# Patient Record
Sex: FEMALE
[De-identification: names, ages, dates, MRNs, and addresses within clinical notes are randomized per-mention and may not be internally consistent; named-entity substitution may affect disease eponyms.]

---

## 2018-10-08 PROBLEM — Z00.00 ENCOUNTER FOR PREVENTIVE HEALTH EXAMINATION: Status: ACTIVE | Noted: 2018-10-08

## 2018-10-19 DIAGNOSIS — N63.0 UNSPECIFIED LUMP IN UNSPECIFIED BREAST: ICD-10-CM

## 2018-11-30 ENCOUNTER — APPOINTMENT (OUTPATIENT)
Dept: OBGYN | Facility: CLINIC | Age: 63
End: 2018-11-30
Payer: COMMERCIAL

## 2018-11-30 VITALS
SYSTOLIC BLOOD PRESSURE: 110 MMHG | DIASTOLIC BLOOD PRESSURE: 66 MMHG | BODY MASS INDEX: 38.82 KG/M2 | WEIGHT: 233 LBS | HEIGHT: 65 IN

## 2018-11-30 DIAGNOSIS — I10 ESSENTIAL (PRIMARY) HYPERTENSION: ICD-10-CM

## 2018-11-30 DIAGNOSIS — E03.9 HYPOTHYROIDISM, UNSPECIFIED: ICD-10-CM

## 2018-11-30 DIAGNOSIS — R92.8 OTHER ABNORMAL AND INCONCLUSIVE FINDINGS ON DIAGNOSTIC IMAGING OF BREAST: ICD-10-CM

## 2018-11-30 DIAGNOSIS — Z82.49 FAMILY HISTORY OF ISCHEMIC HEART DISEASE AND OTHER DISEASES OF THE CIRCULATORY SYSTEM: ICD-10-CM

## 2018-11-30 PROCEDURE — 99213 OFFICE O/P EST LOW 20 MIN: CPT

## 2018-11-30 RX ORDER — LEVOTHYROXINE SODIUM 125 UG/1
125 TABLET ORAL
Refills: 0 | Status: ACTIVE | COMMUNITY
Start: 2018-11-30

## 2018-11-30 RX ORDER — LOSARTAN POTASSIUM 100 MG/1
100 TABLET, FILM COATED ORAL
Refills: 0 | Status: ACTIVE | COMMUNITY
Start: 2018-11-30

## 2019-01-05 ENCOUNTER — RECORD ABSTRACTING (OUTPATIENT)
Age: 64
End: 2019-01-05

## 2019-01-05 DIAGNOSIS — R92.2 INCONCLUSIVE MAMMOGRAM: ICD-10-CM

## 2019-01-05 DIAGNOSIS — E03.9 HYPOTHYROIDISM, UNSPECIFIED: ICD-10-CM

## 2019-01-05 DIAGNOSIS — Z87.898 PERSONAL HISTORY OF OTHER SPECIFIED CONDITIONS: ICD-10-CM

## 2019-01-05 DIAGNOSIS — R73.03 PREDIABETES.: ICD-10-CM

## 2019-01-05 DIAGNOSIS — Z86.79 PERSONAL HISTORY OF OTHER DISEASES OF THE CIRCULATORY SYSTEM: ICD-10-CM

## 2019-01-05 DIAGNOSIS — G47.30 SLEEP APNEA, UNSPECIFIED: ICD-10-CM

## 2019-09-06 ENCOUNTER — APPOINTMENT (OUTPATIENT)
Dept: OBGYN | Facility: CLINIC | Age: 64
End: 2019-09-06
Payer: COMMERCIAL

## 2019-09-06 DIAGNOSIS — Z01.419 ENCOUNTER FOR GYNECOLOGICAL EXAMINATION (GENERAL) (ROUTINE) W/OUT ABNORMAL FINDINGS: ICD-10-CM

## 2019-09-06 PROCEDURE — 99397 PER PM REEVAL EST PAT 65+ YR: CPT

## 2019-09-06 NOTE — PHYSICAL EXAM
[Awake] : awake [Alert] : alert [Soft] : soft [Oriented x3] : oriented to person, place, and time [No Lesions] : no genitalia lesions [Normal] : vagina [Labia Majora] : labia major [Labia Minora] : labia minora [No Bleeding] : there was no active vaginal bleeding [Absent] : absent [Adnexa Absent] : absent bilaterally [Acute Distress] : no acute distress [Mass] : no breast mass [Nipple Discharge] : no nipple discharge [Axillary LAD] : no axillary lymphadenopathy [Tender] : non tender [External Hemorrhoid] : no external hemorrhoids

## 2019-12-04 ENCOUNTER — RECORD ABSTRACTING (OUTPATIENT)
Age: 64
End: 2019-12-04

## 2019-12-04 DIAGNOSIS — Z78.9 OTHER SPECIFIED HEALTH STATUS: ICD-10-CM

## 2019-12-04 DIAGNOSIS — Z82.49 FAMILY HISTORY OF ISCHEMIC HEART DISEASE AND OTHER DISEASES OF THE CIRCULATORY SYSTEM: ICD-10-CM

## 2019-12-04 LAB
CYTOLOGY CVX/VAG DOC THIN PREP: NORMAL
CYTOLOGY CVX/VAG DOC THIN PREP: NORMAL

## 2020-12-21 PROBLEM — Z01.419 ENCOUNTER FOR WELL WOMAN EXAM WITH ROUTINE GYNECOLOGICAL EXAM: Status: RESOLVED | Noted: 2019-09-06 | Resolved: 2020-12-21

## 2022-06-26 ENCOUNTER — FORM ENCOUNTER (OUTPATIENT)
Age: 67
End: 2022-06-26

## 2024-07-30 ENCOUNTER — EMERGENCY (EMERGENCY)
Facility: HOSPITAL | Age: 69
LOS: 0 days | Discharge: HOME | End: 2024-07-31
Attending: STUDENT IN AN ORGANIZED HEALTH CARE EDUCATION/TRAINING PROGRAM
Payer: MEDICARE

## 2024-07-30 VITALS
WEIGHT: 238.98 LBS | HEIGHT: 65 IN | DIASTOLIC BLOOD PRESSURE: 80 MMHG | RESPIRATION RATE: 18 BRPM | OXYGEN SATURATION: 98 % | SYSTOLIC BLOOD PRESSURE: 181 MMHG | TEMPERATURE: 98 F | HEART RATE: 58 BPM
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
## 2024-07-30 DIAGNOSIS — M25.532 PAIN IN LEFT WRIST: ICD-10-CM
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
## 2024-07-30 PROCEDURE — 99053 MED SERV 10PM-8AM 24 HR FAC: CPT

## 2024-07-30 PROCEDURE — 99284 EMERGENCY DEPT VISIT MOD MDM: CPT

## 2024-07-30 NOTE — ED ADULT TRIAGE NOTE - CHIEF COMPLAINT QUOTE
Patient injured her left wrist today s/p fall at the gym. Denies hitting her head. Pmh htn, hypothyroidism.

## 2024-07-31 VITALS
TEMPERATURE: 99 F | DIASTOLIC BLOOD PRESSURE: 83 MMHG | RESPIRATION RATE: 18 BRPM | HEART RATE: 67 BPM | OXYGEN SATURATION: 98 % | SYSTOLIC BLOOD PRESSURE: 169 MMHG

## 2024-07-31 PROCEDURE — 73110 X-RAY EXAM OF WRIST: CPT | Mod: 26,LT

## 2024-07-31 PROCEDURE — 73120 X-RAY EXAM OF HAND: CPT | Mod: 26,LT

## 2024-07-31 RX ORDER — OXYCODONE AND ACETAMINOPHEN 5; 325 MG/1; MG/1
1 TABLET ORAL
Qty: 8 | Refills: 0
Start: 2024-07-31 | End: 2024-08-03

## 2024-07-31 RX ORDER — NAPROXEN SODIUM 550 MG
1 TABLET ORAL
Qty: 20 | Refills: 0
Start: 2024-07-31 | End: 2024-08-09

## 2024-07-31 NOTE — ED ADULT NURSE NOTE - OBJECTIVE STATEMENT
Pt AOx4, ambulatory w/ steady gait. Pt presents to ED s/p accidental mechanical fall Tuesday evening around 6pm. Pt states she was at the gym and tripped over the gym equipment, fell on L wrist. Pt presently c/o 9/10 pain in the L wrist. Pt denies head strike/LOC, denies taking blood thinners. As per pt, PMH HTN, hypothyroidism.

## 2024-07-31 NOTE — ED PROVIDER NOTE - CLINICAL SUMMARY MEDICAL DECISION MAKING FREE TEXT BOX
69-year-old female no significant past medical history presenting with wrist pain for 6 hours.  Patient had FOOSH injury on left wrist, has diffuse tenderness.  Denies any head trauma.  Denies taking any analgesia before coming here.  Exam findings above, patient hemodynamically stable.  Will treat pain with Percocet.  Clinical presentation likely secondary to sprain, x-ray ordered which showed no fracture of the hand or wrist.  Call orthopedist for second opinion, no x-ray fracture appreciated, no snuffbox tenderness.  Will apply Ace wrap, give sprain instructions and give sports medicine follow-up and prescribe analgesia.  Patient comfortable with plan for discharge

## 2024-07-31 NOTE — ED PROVIDER NOTE - PHYSICAL EXAMINATION
General: No acute distress, mentation at baseline,  well nourished, well developed  HEENT: NCAT, Neck supple without meningismus, PERRL, no conjunctival injection  Lungs: CTAB, No wheeze or crackles, No retractions, No increased work of breathing  Heart: S1S2 RRR, No M/R/G, Pules equal Bilaterally in upper and lower extremities distally  Abd: soft, NT/ND, No guarding, No rebound.  No hernias, no palpable masses.  LUE: Fingers: normal inspection, non tender, normal color, normal tendon function including FDS and FDP functions   Hand: normal inspection, non tender, normal color, tendon function intact   Wrist: normal inspection, diffuse wrist tenderness w swelling  All motor function and sensation intact among ulnar/radial/median nerve distribution   Skin: No rash noted, warm dry.  Neuro:  Grossly normal.  No difficulty ambulating. No focal deficits.  Psychiatric: Appropriate mood and affect.

## 2024-07-31 NOTE — ED PROVIDER NOTE - NSFOLLOWUPCLINICS_GEN_ALL_ED_FT
[facility] Sports Medicine  Sports Medicine  [address] [place] [address]  Phone: [phone]  Fax:

## 2024-07-31 NOTE — ED PROVIDER NOTE - PATIENT PORTAL LINK FT
You can access the FollowMyHealth Patient Portal offered by [facility] by registering at the following website: [email or web address]. By joining [facility]’s FollowMyHealth portal, you will also be able to view your health information using other applications (apps) compatible with our system.

## 2024-07-31 NOTE — ED PROVIDER NOTE - NS ED ROS FT
CONST: no fevers, no chills  EYES: no pain, no vision changes  ENT: no sore throat, no ear pain, no change in hearing  CV: no chest pain, no leg swelling  RESP: no shortness of breath, no cough  ABD: no abdominal pain, no nausea, no vomiting, no diarrhea  : no dysuria, no flank pain, no hematuria  MSK: no back pain, (+) extremity pain  NEURO: no headache or additional neurologic complaints  HEME: no easy bleeding  SKIN:  no rash
[phone]

## 2024-08-01 PROBLEM — Z78.9 OTHER SPECIFIED HEALTH STATUS: Chronic | Status: ACTIVE | Noted: 2024-07-31

## 2024-08-08 ENCOUNTER — APPOINTMENT (OUTPATIENT)
Dept: SPORTS MEDICINE | Facility: CLINIC | Age: 69
End: 2024-08-08

## 2024-08-08 PROCEDURE — 99214 OFFICE O/P EST MOD 30 MIN: CPT

## 2024-08-08 NOTE — DISCUSSION/SUMMARY
[2 Weeks] : in 2 weeks [de-identified] : Attending Attestation:  Seen with Dr. Rodas I saw and evaluated the patient and was involved with and agree with the fellow's history, physical exam, and I personally documented the assessment and plan of care.  69y yo female pt who is RHD w/ PMHx HTN, hypothyroid, fatty liver who presents to the clinic for pain to left base of thumb and dorsal wrist pain after FOOH injury on 7/30/2024. The patient has been utilizing PO analgesia and volar wrist splint. Patient's pain has been improving since and is no longer requiring PO medication for pain control.  Patient with tenderness over the radiocarpal joint, first CMC joint, scapholunate and DRUJ.  Given diffuse areas of pain and tenderness, will immobilize and reassess to evaluate if symptoms localize to guide further evaluation with imaging and treatment.  Will exchange volar splint to a thumb spica splint.  Patient declining pain medication at this time  Will treat symptoms with conservative management with activity modification, analgesic medications, HEP/PT, and re-evaluate the patient to see if they would benefit from further diagnostic testing, or referral for injection therapy or surgical intervention.   Clinical and radiographic findings discussed. Diagnosis, prognosis and treatment options reviewed. Patient verbalizes understanding and all questions answered. The patient adamant at this time about not having any type of surgical intervention. Wants only to continue with non-operative management.     PLAN: Thumb spina brace  Additional Testing: none  Further Intervention: HEP reviewed, consider PT in future  Weight-bearing Status: WBAT  Assistive Devices: None  Health Management: BMI/Weight Management and physical activity level reviewed with the patient  Home Modalities: RICE protocol for pain/swelling and home modalities reviewed with the patient  Medications: OTC analgesics  Orthotics: Thumb spica brace  Work Status: Retired  Activity Status: Avoid aggravating and high impact activities  Sports/Gym Status: No gym or sports until cleared medically  Follow-up: 2 weeks for reevaluation, repeat x-ray  Time Based Billing: Total time spent with patient: 41 minutes. More than 50% of today's visit was devoted to face-to-face counseling regarding the following:   education and discussion about the diagnosis, prognosis, treatment options, and possible complications of treatments. Discussed treatment options. Discussed risks and benefits of treatment. Instructions covering recommended treatment. Detection of adverse events and importance of compliance. Discussed prognosis.   Umberto Donaldson MD

## 2024-08-08 NOTE — HISTORY OF PRESENT ILLNESS
[de-identified] : 68yo female pt who is RHD w/ PMHx of HTN, HLD, fatty liver, hypothyroidism who presents for left thumb and hand pain since 7/30 after FOOH injury. The patient was evaluated in the ED on 7/31 and was sent home with ACE wrap and analgesia PO after xray resulted with no fractures. The patient was taking Tylenol and icing the extremity initially which helped with the pain. For the past couple of days, has deferred from taking medication given improvement of pain. She has been wearing a short volar wrist spint and using heat packs over the volar palm. Pain does get exacerbated with ROM and strength/ resistance.

## 2024-08-08 NOTE — REVIEW OF SYSTEMS
[Joint Pain] : joint pain [Joint Swelling] : joint swelling [Negative] : Neurological [Fever] : no fever [Chills] : no chills [[email or web address]] : L thumb pain

## 2024-08-08 NOTE — DISCUSSION/SUMMARY
[2 Weeks] : in 2 weeks [de-identified] : Attending Attestation:  Seen with Dr. Rodas I saw and evaluated the patient and was involved with and agree with the fellow's history, physical exam, and I personally documented the assessment and plan of care.  69y yo female pt who is RHD w/ PMHx HTN, hypothyroid, fatty liver who presents to the clinic for pain to left base of thumb and dorsal wrist pain after FOOH injury on 7/30/2024. The patient has been utilizing PO analgesia and volar wrist splint. Patient's pain has been improving since and is no longer requiring PO medication for pain control.  Patient with tenderness over the radiocarpal joint, first CMC joint, scapholunate and DRUJ.  Given diffuse areas of pain and tenderness, will immobilize and reassess to evaluate if symptoms localize to guide further evaluation with imaging and treatment.  Will exchange volar splint to a thumb spica splint.  Patient declining pain medication at this time  Will treat symptoms with conservative management with activity modification, analgesic medications, HEP/PT, and re-evaluate the patient to see if they would benefit from further diagnostic testing, or referral for injection therapy or surgical intervention.   Clinical and radiographic findings discussed. Diagnosis, prognosis and treatment options reviewed. Patient verbalizes understanding and all questions answered. The patient adamant at this time about not having any type of surgical intervention. Wants only to continue with non-operative management.     PLAN: Thumb spina brace  Additional Testing: none  Further Intervention: HEP reviewed, consider PT in future  Weight-bearing Status: WBAT  Assistive Devices: None  Health Management: BMI/Weight Management and physical activity level reviewed with the patient  Home Modalities: RICE protocol for pain/swelling and home modalities reviewed with the patient  Medications: OTC analgesics  Orthotics: Thumb spica brace  Work Status: Retired  Activity Status: Avoid aggravating and high impact activities  Sports/Gym Status: No gym or sports until cleared medically  Follow-up: 2 weeks for reevaluation, repeat x-ray  Time Based Billing: Total time spent with patient: 41 minutes. More than 50% of today's visit was devoted to face-to-face counseling regarding the following:   education and discussion about the diagnosis, prognosis, treatment options, and possible complications of treatments. Discussed treatment options. Discussed risks and benefits of treatment. Instructions covering recommended treatment. Detection of adverse events and importance of compliance. Discussed prognosis.   Umberto Donaldson MD

## 2024-08-08 NOTE — HISTORY OF PRESENT ILLNESS
[de-identified] : 70yo female pt who is RHD w/ PMHx of HTN, HLD, fatty liver, hypothyroidism who presents for left thumb and hand pain since 7/30 after FOOH injury. The patient was evaluated in the ED on 7/31 and was sent home with ACE wrap and analgesia PO after xray resulted with no fractures. The patient was taking Tylenol and icing the extremity initially which helped with the pain. For the past couple of days, has deferred from taking medication given improvement of pain. She has been wearing a short volar wrist spint and using heat packs over the volar palm. Pain does get exacerbated with ROM and strength/ resistance.

## 2024-08-08 NOTE — HISTORY OF PRESENT ILLNESS
[de-identified] : 68yo female pt who is RHD w/ PMHx of HTN, HLD, fatty liver, hypothyroidism who presents for left thumb and hand pain since 7/30 after FOOH injury. The patient was evaluated in the ED on 7/31 and was sent home with ACE wrap and analgesia PO after xray resulted with no fractures. The patient was taking Tylenol and icing the extremity initially which helped with the pain. For the past couple of days, has deferred from taking medication given improvement of pain. She has been wearing a short volar wrist spint and using heat packs over the volar palm. Pain does get exacerbated with ROM and strength/ resistance.

## 2024-08-08 NOTE — HISTORY OF PRESENT ILLNESS
[de-identified] : 68yo female pt who is RHD w/ PMHx of HTN, HLD, fatty liver, hypothyroidism who presents for left thumb and hand pain since 7/30 after FOOH injury. The patient was evaluated in the ED on 7/31 and was sent home with ACE wrap and analgesia PO after xray resulted with no fractures. The patient was taking Tylenol and icing the extremity initially which helped with the pain. For the past couple of days, has deferred from taking medication given improvement of pain. She has been wearing a short volar wrist spint and using heat packs over the volar palm. Pain does get exacerbated with ROM and strength/ resistance.

## 2024-08-08 NOTE — DISCUSSION/SUMMARY
[2 Weeks] : in 2 weeks [de-identified] : Attending Attestation:  Seen with Dr. Rodas I saw and evaluated the patient and was involved with and agree with the fellow's history, physical exam, and I personally documented the assessment and plan of care.  69y yo female pt who is RHD w/ PMHx HTN, hypothyroid, fatty liver who presents to the clinic for pain to left base of thumb and dorsal wrist pain after FOOH injury on 7/30/2024. The patient has been utilizing PO analgesia and volar wrist splint. Patient's pain has been improving since and is no longer requiring PO medication for pain control.  Patient with tenderness over the radiocarpal joint, first CMC joint, scapholunate and DRUJ.  Given diffuse areas of pain and tenderness, will immobilize and reassess to evaluate if symptoms localize to guide further evaluation with imaging and treatment.  Will exchange volar splint to a thumb spica splint.  Patient declining pain medication at this time  Will treat symptoms with conservative management with activity modification, analgesic medications, HEP/PT, and re-evaluate the patient to see if they would benefit from further diagnostic testing, or referral for injection therapy or surgical intervention.   Clinical and radiographic findings discussed. Diagnosis, prognosis and treatment options reviewed. Patient verbalizes understanding and all questions answered. The patient adamant at this time about not having any type of surgical intervention. Wants only to continue with non-operative management.     PLAN: Thumb spina brace  Additional Testing: none  Further Intervention: HEP reviewed, consider PT in future  Weight-bearing Status: WBAT  Assistive Devices: None  Health Management: BMI/Weight Management and physical activity level reviewed with the patient  Home Modalities: RICE protocol for pain/swelling and home modalities reviewed with the patient  Medications: OTC analgesics  Orthotics: Thumb spica brace  Work Status: Retired  Activity Status: Avoid aggravating and high impact activities  Sports/Gym Status: No gym or sports until cleared medically  Follow-up: 2 weeks for reevaluation, repeat x-ray  Time Based Billing: Total time spent with patient: 41 minutes. More than 50% of today's visit was devoted to face-to-face counseling regarding the following:   education and discussion about the diagnosis, prognosis, treatment options, and possible complications of treatments. Discussed treatment options. Discussed risks and benefits of treatment. Instructions covering recommended treatment. Detection of adverse events and importance of compliance. Discussed prognosis.   Umberto Donaldson MD

## 2024-08-08 NOTE — HISTORY OF PRESENT ILLNESS
[de-identified] : 68yo female pt who is RHD w/ PMHx of HTN, HLD, fatty liver, hypothyroidism who presents for left thumb and hand pain since 7/30 after FOOH injury. The patient was evaluated in the ED on 7/31 and was sent home with ACE wrap and analgesia PO after xray resulted with no fractures. The patient was taking Tylenol and icing the extremity initially which helped with the pain. For the past couple of days, has deferred from taking medication given improvement of pain. She has been wearing a short volar wrist spint and using heat packs over the volar palm. Pain does get exacerbated with ROM and strength/ resistance.

## 2024-08-08 NOTE — DISCUSSION/SUMMARY
[2 Weeks] : in 2 weeks [de-identified] : Attending Attestation:  Seen with Dr. Rodas I saw and evaluated the patient and was involved with and agree with the fellow's history, physical exam, and I personally documented the assessment and plan of care.  69y yo female pt who is RHD w/ PMHx HTN, hypothyroid, fatty liver who presents to the clinic for pain to left base of thumb and dorsal wrist pain after FOOH injury on 7/30/2024. The patient has been utilizing PO analgesia and volar wrist splint. Patient's pain has been improving since and is no longer requiring PO medication for pain control.  Patient with tenderness over the radiocarpal joint, first CMC joint, scapholunate and DRUJ.  Given diffuse areas of pain and tenderness, will immobilize and reassess to evaluate if symptoms localize to guide further evaluation with imaging and treatment.  Will exchange volar splint to a thumb spica splint.  Patient declining pain medication at this time  Will treat symptoms with conservative management with activity modification, analgesic medications, HEP/PT, and re-evaluate the patient to see if they would benefit from further diagnostic testing, or referral for injection therapy or surgical intervention.   Clinical and radiographic findings discussed. Diagnosis, prognosis and treatment options reviewed. Patient verbalizes understanding and all questions answered. The patient adamant at this time about not having any type of surgical intervention. Wants only to continue with non-operative management.     PLAN: Thumb spina brace  Additional Testing: none  Further Intervention: HEP reviewed, consider PT in future  Weight-bearing Status: WBAT  Assistive Devices: None  Health Management: BMI/Weight Management and physical activity level reviewed with the patient  Home Modalities: RICE protocol for pain/swelling and home modalities reviewed with the patient  Medications: OTC analgesics  Orthotics: Thumb spica brace  Work Status: Retired  Activity Status: Avoid aggravating and high impact activities  Sports/Gym Status: No gym or sports until cleared medically  Follow-up: 2 weeks for reevaluation, repeat x-ray  Time Based Billing: Total time spent with patient: 41 minutes. More than 50% of today's visit was devoted to face-to-face counseling regarding the following:   education and discussion about the diagnosis, prognosis, treatment options, and possible complications of treatments. Discussed treatment options. Discussed risks and benefits of treatment. Instructions covering recommended treatment. Detection of adverse events and importance of compliance. Discussed prognosis.   Umberto Donaldson MD

## 2024-08-08 NOTE — DISCUSSION/SUMMARY
[2 Weeks] : in 2 weeks [de-identified] : Attending Attestation:  Seen with Dr. Rodas I saw and evaluated the patient and was involved with and agree with the fellow's history, physical exam, and I personally documented the assessment and plan of care.  69y yo female pt who is RHD w/ PMHx HTN, hypothyroid, fatty liver who presents to the clinic for pain to left base of thumb and dorsal wrist pain after FOOH injury on 7/30/2024. The patient has been utilizing PO analgesia and volar wrist splint. Patient's pain has been improving since and is no longer requiring PO medication for pain control.  Patient with tenderness over the radiocarpal joint, first CMC joint, scapholunate and DRUJ.  Given diffuse areas of pain and tenderness, will immobilize and reassess to evaluate if symptoms localize to guide further evaluation with imaging and treatment.  Will exchange volar splint to a thumb spica splint.  Patient declining pain medication at this time  Will treat symptoms with conservative management with activity modification, analgesic medications, HEP/PT, and re-evaluate the patient to see if they would benefit from further diagnostic testing, or referral for injection therapy or surgical intervention.   Clinical and radiographic findings discussed. Diagnosis, prognosis and treatment options reviewed. Patient verbalizes understanding and all questions answered. The patient adamant at this time about not having any type of surgical intervention. Wants only to continue with non-operative management.     PLAN: Thumb spina brace  Additional Testing: none  Further Intervention: HEP reviewed, consider PT in future  Weight-bearing Status: WBAT  Assistive Devices: None  Health Management: BMI/Weight Management and physical activity level reviewed with the patient  Home Modalities: RICE protocol for pain/swelling and home modalities reviewed with the patient  Medications: OTC analgesics  Orthotics: Thumb spica brace  Work Status: Retired  Activity Status: Avoid aggravating and high impact activities  Sports/Gym Status: No gym or sports until cleared medically  Follow-up: 2 weeks for reevaluation, repeat x-ray  Time Based Billing: Total time spent with patient: 41 minutes. More than 50% of today's visit was devoted to face-to-face counseling regarding the following:   education and discussion about the diagnosis, prognosis, treatment options, and possible complications of treatments. Discussed treatment options. Discussed risks and benefits of treatment. Instructions covering recommended treatment. Detection of adverse events and importance of compliance. Discussed prognosis.   Umberto Donaldson MD

## 2024-08-12 NOTE — PHYSICAL EXAM
[de-identified] : General Appearance: Well-nourished, well developed in no acute distress Orientation: Oriented to person, place and time. Gait: normal Coordination: normal Inspection / Palpation UE (R/L): Edema noted to left dorsal aspect of thumb and wrist. Tender over the MCP and basal carpometacarpal joint of first digit with carporadial joint.  No tenderness over the scaphoid tubercle, no significant pain with axial loading. 1st digit Flexion found painful on L on exam. 1st digit Extension found painful on L on exam. 1st digit Adduction found painful on L on exam. 1st digit MCP abduction and adduction strength limited iso pain Vasculature: 2+ radial pulses Skin: no ecchymosis     [de-identified] : Ultrasound performed of the Left distal upper extremity. Edema was visualized surrounding the extensor ligaments in the 4th compartment of wrist.

## 2024-08-12 NOTE — PHYSICAL EXAM
[de-identified] : General Appearance: Well-nourished, well developed in no acute distress Orientation: Oriented to person, place and time. Gait: normal Coordination: normal Inspection / Palpation UE (R/L): Edema noted to left dorsal aspect of thumb and wrist. Tender over the MCP and basal carpometacarpal joint of first digit with carporadial joint.  No tenderness over the scaphoid tubercle, no significant pain with axial loading. 1st digit Flexion found painful on L on exam. 1st digit Extension found painful on L on exam. 1st digit Adduction found painful on L on exam. 1st digit MCP abduction and adduction strength limited iso pain Vasculature: 2+ radial pulses Skin: no ecchymosis     [de-identified] : Ultrasound performed of the Left distal upper extremity. Edema was visualized surrounding the extensor ligaments in the 4th compartment of wrist.

## 2024-08-12 NOTE — PHYSICAL EXAM
[de-identified] : General Appearance: Well-nourished, well developed in no acute distress Orientation: Oriented to person, place and time. Gait: normal Coordination: normal Inspection / Palpation UE (R/L): Edema noted to left dorsal aspect of thumb and wrist. Tender over the MCP and basal carpometacarpal joint of first digit with carporadial joint.  No tenderness over the scaphoid tubercle, no significant pain with axial loading. 1st digit Flexion found painful on L on exam. 1st digit Extension found painful on L on exam. 1st digit Adduction found painful on L on exam. 1st digit MCP abduction and adduction strength limited iso pain Vasculature: 2+ radial pulses Skin: no ecchymosis     [de-identified] : Ultrasound performed of the Left distal upper extremity. Edema was visualized surrounding the extensor ligaments in the 4th compartment of wrist.

## 2024-08-12 NOTE — PHYSICAL EXAM
[de-identified] : General Appearance: Well-nourished, well developed in no acute distress Orientation: Oriented to person, place and time. Gait: normal Coordination: normal Inspection / Palpation UE (R/L): Edema noted to left dorsal aspect of thumb and wrist. Tender over the MCP and basal carpometacarpal joint of first digit with carporadial joint.  No tenderness over the scaphoid tubercle, no significant pain with axial loading. 1st digit Flexion found painful on L on exam. 1st digit Extension found painful on L on exam. 1st digit Adduction found painful on L on exam. 1st digit MCP abduction and adduction strength limited iso pain Vasculature: 2+ radial pulses Skin: no ecchymosis     [de-identified] : Ultrasound performed of the Left distal upper extremity. Edema was visualized surrounding the extensor ligaments in the 4th compartment of wrist.

## 2024-08-12 NOTE — PHYSICAL EXAM
[de-identified] : General Appearance: Well-nourished, well developed in no acute distress Orientation: Oriented to person, place and time. Gait: normal Coordination: normal Inspection / Palpation UE (R/L): Edema noted to left dorsal aspect of thumb and wrist. Tender over the MCP and basal carpometacarpal joint of first digit with carporadial joint.  No tenderness over the scaphoid tubercle, no significant pain with axial loading. 1st digit Flexion found painful on L on exam. 1st digit Extension found painful on L on exam. 1st digit Adduction found painful on L on exam. 1st digit MCP abduction and adduction strength limited iso pain Vasculature: 2+ radial pulses Skin: no ecchymosis     [de-identified] : Ultrasound performed of the Left distal upper extremity. Edema was visualized surrounding the extensor ligaments in the 4th compartment of wrist.

## 2024-08-20 ENCOUNTER — APPOINTMENT (OUTPATIENT)
Dept: SPORTS MEDICINE | Facility: CLINIC | Age: 69
End: 2024-08-20
Payer: MEDICARE

## 2024-08-20 VITALS — HEIGHT: 65 IN | WEIGHT: 239 LBS | BODY MASS INDEX: 39.82 KG/M2

## 2024-08-20 DIAGNOSIS — M25.532 PAIN IN LEFT WRIST: ICD-10-CM

## 2024-08-20 PROCEDURE — 99214 OFFICE O/P EST MOD 30 MIN: CPT

## 2024-08-20 NOTE — DISCUSSION/SUMMARY
[2 Weeks] : in 2 weeks [de-identified] : Attending Attestation:  Seen with Dr. Haley I saw and evaluated the patient and was involved with and agree with the fellow's history, physical exam, and I personally documented the assessment and plan of care.  69y yo female pt who is RHD w/ PMHx HTN, hypothyroid, fatty liver who presents to the clinic for pain to left base of thumb and dorsal wrist pain after FOOH injury on 7/30/2024. Clinically, she continues to improve, but does have evidence of possible TFCC injury and dequervain tenosynovititis vs wrist ligamentous sprain. Not consistent with occult scaphoid fracture given diffuseness of tenderness, and no callus on repeat imaging.  We will continue with thumb spica immobilization and start with PT. She may use OTC pain meds as needed.   Will treat symptoms with conservative management with activity modification, analgesic medications, HEP/PT, and re-evaluate the patient to see if they would benefit from further diagnostic testing, or referral for injection therapy or surgical intervention.   Clinical and radiographic findings discussed. Diagnosis, prognosis and treatment options reviewed. Patient verbalizes understanding and all questions answered. The patient adamant at this time about not having any type of surgical intervention. Wants only to continue with non-operative management.    PLAN: Continue with thumb spica brace. PT referral sent  Additional Testing: none Further Intervention: PT, HEP Weight-bearing Status: WBAT Assistive Devices: None Health Management: BMI/Weight Management and physical activity level reviewed with the patient Home Modalities: RICE protocol for pain/swelling and home modalities reviewed with the patient Medications: OTC analgesics as needed Orthotics: Thumb spica brace  Work Status: Retired Activity Status: Avoid aggravating and high impact activities Sports/Gym Status: No gym or sports until cleared medically Follow-up: 4-6 weeks for reevaluation, consider MRI if no improvement in symptoms  Time Based Billing: Total time spent with patient: 31 minutes. More than 50% of today's visit was devoted to face-to-face counseling regarding the following:   education and discussion about the diagnosis, prognosis, treatment options, and possible complications of treatments. Discussed treatment options. Discussed risks and benefits of treatment. Instructions covering recommended treatment. Detection of adverse events and importance of compliance. Discussed prognosis.  Umberto Donaldson MD

## 2024-08-20 NOTE — HISTORY OF PRESENT ILLNESS
[de-identified] : 68yo female pt who is RHD w/ PMHx of HTN, HLD, fatty liver, hypothyroidism who presents for left thumb and hand pain since 7/30 after FOOH injury. The patient was evaluated in the ED on 7/31 and was sent home with ACE wrap and analgesia PO after xray resulted with no fractures. She presented to clinic 2 weeks ago and was switched to a removable thumb spica splint. Today, she reports resolution of most of her pain and swelling. She states minor movements (ulnar and radial deviation) now trigger pain along the top of her thumb, but this is very infrequent. No longer using OTC meds for pain. She is able to complete her ADLs. No numbness, tingling or weakness to thumb.

## 2024-08-20 NOTE — PHYSICAL EXAM
[de-identified] : General Appearance: Well-nourished, well developed in no acute distress Orientation: Oriented to person, place and time. Gait: normal Coordination: normal Inspection / Palpation UE (R/L):  Edema noted to left dorsal aspect of thumb and wrist (but improved). No tenderness over the MCP.  No tenderness over the scaphoid tubercle, no pain with axial loading. (+) TTP over the TFCC, 1st extensor compartment over the distal radius, and the CMC joint, minimal tenderness present over anatomic snuffbox. No tenderness with 1st digit flexion, extension 1st digit Flexion found painful on L on exam. 1st digit MCP abduction and adduction strength 5/5 and symmetric to R thumb Vasculature: 2+ radial pulses Skin: no ecchymosis, trace edema present Positive Finkelstein Positive ulnar grind test Negative piano key test Negative Ruiz test [de-identified] : 07/31/2024: Left hand and wrist xray from [facility] ED demonstrated 'Small well-corticated irregular ossific densities just medial  to the trapezium which may be related to trauma of indeterminate age.  Some osteoarthritic changes at the first carpometacarpal joint. '  08/08/2024: Ultrasound performed of the Left distal upper extremity. Edema was visualized surrounding the extensor ligaments in the 4th compartment of wrist.  08/20/2024: X-ray of the Left wrist demonstrates normal alignment of metacarpal bones without obvious deformity, fracture or callous formation (including over scaphoid). There is a small ossification radial to the trapezium. There is mild degenerative changes to the CMC joint. Appears similar compared to 7/31/2024.

## 2024-09-17 ENCOUNTER — APPOINTMENT (OUTPATIENT)
Dept: SPORTS MEDICINE | Facility: CLINIC | Age: 69
End: 2024-09-17
Payer: MEDICARE

## 2024-09-17 VITALS — HEIGHT: 65 IN | BODY MASS INDEX: 39.82 KG/M2 | WEIGHT: 239 LBS

## 2024-09-17 PROCEDURE — 99213 OFFICE O/P EST LOW 20 MIN: CPT

## 2024-09-17 NOTE — DISCUSSION/SUMMARY
[2 Weeks] : in 2 weeks [de-identified] : Attending Attestation:  Seen with Dr. Haley I saw and evaluated the patient and was involved with and agree with the fellow's history, physical exam, and I personally documented the assessment and plan of care.  69y yo female pt who is RHD w/ PMHx HTN, hypothyroid, fatty liver who presents to the clinic for pain to left base of thumb and dorsal wrist pain after FOOH injury on 7/30/2024. Her symptoms are continuing to improve and she is able to complete her ADLs without difficulties. She no longer has evidence of TFCC injury, but remains with mild De Quervain tenosynovitis. She may also have wrist ligamentous sprain. Not consistent with occult scaphoid fracture as she no longer has snuffbox ttp, and there was no callus on repeat imaging.  We will continue with nightly thumb spica immobilization and continue with 4 more weeks of OT. She may use OTC pain meds as needed. We discussed with the patient about obtaining MRI, but since she is continuing to improve and it would not likely change her management. However, If she does not continue to improve, we will pursue an MRI of her left wrist.   Will treat symptoms with conservative management with activity modification, analgesic medications, HEP/PT, and re-evaluate the patient to see if they would benefit from further diagnostic testing, or referral for injection therapy or surgical intervention.   Clinical and radiographic findings discussed. Diagnosis, prognosis and treatment options reviewed. Patient verbalizes understanding and all questions answered. The patient adamant at this time about not having any type of surgical intervention. Wants only to continue with non-operative management.    PLAN: Continue with thumb spica brace at night time and OT Additional Testing: none Further Intervention: OT, HEP Weight-bearing Status: WBAT Assistive Devices: None Health Management: BMI/Weight Management and physical activity level reviewed with the patient Home Modalities: RICE protocol for pain/swelling and home modalities reviewed with the patient Medications: OTC analgesics as needed Orthotics: Thumb spica brace  Work Status: Retired Activity Status: Avoid aggravating and high impact activities Sports/Gym Status: No gym or sports until cleared medically Follow-up: 4 weeks for reevaluation, consider MRI if no improvement in symptoms  Umberto Donaldson MD

## 2024-09-17 NOTE — HISTORY OF PRESENT ILLNESS
[de-identified] : 68yo female pt who is RHD w/ PMHx of HTN, HLD, fatty liver, hypothyroidism who presents for left thumb and hand pain since 7/30 after FOOH injury. The patient was evaluated in the ED on 7/31 and was sent home with ACE wrap and analgesia PO after xray resulted with no fractures.  9/17/24 She reports 90% improvement since initial injury. She has been going to OT twice a week for the last 4 weeks. She has transitioned into nightime thumb spica use only. States she has very infrequent episodes of pain and sometimes "shocking" sensation in her fingers. Still having swelling to her left hand. No numbness, weakness or pallor.

## 2024-09-17 NOTE — PHYSICAL EXAM
[de-identified] : General Appearance: Well-nourished, well developed in no acute distress Orientation: Oriented to person, place and time. Gait: normal Coordination: normal Inspection / Palpation UE (R/L):  Edema noted to left dorsal aspect of thumb and wrist and volar thenar aspect (stable compared to last time). No tenderness over the MCP.  No tenderness over the scaphoid tubercle, no pain with axial loading. There is no TTP over the TFCC. Minimally tender to the 1st extensor compartment over the distal radius, and the CMC joint. No tenderness present over anatomic snuffbox or DRUJ. No tenderness with 1st digit flexion, extension 1st digit Flexion found painful on L on exam. 1st digit MCP abduction and adduction strength 5/5 and symmetric to R thumb Vasculature: 2+ radial pulses Skin: no ecchymosis, trace edema present Positive Finkelstein Negative ulnar grind test Negative piano key test Negative Ruiz test [de-identified] : 07/31/2024: Left hand and wrist x[facility] from [facility] ED demonstrated 'Small well-corticated irregular ossific densities just medial  to the trapezium which may be related to trauma of indeterminate age.  Some osteoarthritic changes at the first carpometacarpal joint. '  08/08/2024: Ultrasound performed of the Left distal upper extremity. Edema was visualized surrounding the extensor ligaments in the 4th compartment of wrist.  08/20/2024: X-ray of the Left wrist demonstrates normal alignment of metacarpal bones without obvious deformity, fracture or callous formation (including over scaphoid). There is a small ossification radial to the trapezium. There is mild degenerative changes to the CMC joint. Appears similar compared to 7/31/2024.

## 2024-10-15 ENCOUNTER — APPOINTMENT (OUTPATIENT)
Dept: SPORTS MEDICINE | Facility: CLINIC | Age: 69
End: 2024-10-15
Payer: MEDICARE

## 2024-10-15 VITALS — BODY MASS INDEX: 39.65 KG/M2 | WEIGHT: 238 LBS | HEIGHT: 65 IN

## 2024-10-15 PROCEDURE — 99212 OFFICE O/P EST SF 10 MIN: CPT

## 2025-02-28 ENCOUNTER — APPOINTMENT (OUTPATIENT)
Dept: INTERNAL MEDICINE | Facility: CLINIC | Age: 70
End: 2025-02-28
Payer: MEDICARE

## 2025-02-28 ENCOUNTER — NON-APPOINTMENT (OUTPATIENT)
Age: 70
End: 2025-02-28

## 2025-02-28 ENCOUNTER — LABORATORY RESULT (OUTPATIENT)
Age: 70
End: 2025-02-28

## 2025-02-28 VITALS
HEART RATE: 60 BPM | BODY MASS INDEX: 40.98 KG/M2 | HEIGHT: 65 IN | SYSTOLIC BLOOD PRESSURE: 167 MMHG | TEMPERATURE: 97.7 F | WEIGHT: 246 LBS | DIASTOLIC BLOOD PRESSURE: 86 MMHG | OXYGEN SATURATION: 98 %

## 2025-02-28 VITALS — DIASTOLIC BLOOD PRESSURE: 82 MMHG | SYSTOLIC BLOOD PRESSURE: 156 MMHG

## 2025-02-28 DIAGNOSIS — N63.0 UNSPECIFIED LUMP IN UNSPECIFIED BREAST: ICD-10-CM

## 2025-02-28 DIAGNOSIS — M25.562 PAIN IN LEFT KNEE: ICD-10-CM

## 2025-02-28 DIAGNOSIS — K80.20 CALCULUS OF GALLBLADDER W/OUT CHOLECYSTITIS W/OUT OBSTRUCTION: ICD-10-CM

## 2025-02-28 DIAGNOSIS — M85.80 OTHER SPECIFIED DISORDERS OF BONE DENSITY AND STRUCTURE, UNSPECIFIED SITE: ICD-10-CM

## 2025-02-28 DIAGNOSIS — E03.9 HYPOTHYROIDISM, UNSPECIFIED: ICD-10-CM

## 2025-02-28 DIAGNOSIS — I10 ESSENTIAL (PRIMARY) HYPERTENSION: ICD-10-CM

## 2025-02-28 DIAGNOSIS — Z00.00 ENCOUNTER FOR GENERAL ADULT MEDICAL EXAMINATION W/OUT ABNORMAL FINDINGS: ICD-10-CM

## 2025-02-28 PROCEDURE — 93000 ELECTROCARDIOGRAM COMPLETE: CPT

## 2025-02-28 PROCEDURE — 90471 IMMUNIZATION ADMIN: CPT

## 2025-02-28 PROCEDURE — 90715 TDAP VACCINE 7 YRS/> IM: CPT

## 2025-02-28 PROCEDURE — 90677 PCV20 VACCINE IM: CPT

## 2025-02-28 PROCEDURE — G0439: CPT

## 2025-02-28 PROCEDURE — G0009: CPT | Mod: 59

## 2025-02-28 RX ORDER — LOSARTAN POTASSIUM 50 MG/1
50 TABLET, FILM COATED ORAL DAILY
Qty: 90 | Refills: 3 | Status: ACTIVE | COMMUNITY
Start: 2025-02-28 | End: 1900-01-01

## 2025-02-28 RX ORDER — AMLODIPINE BESYLATE 10 MG/1
10 TABLET ORAL
Qty: 90 | Refills: 3 | Status: ACTIVE | COMMUNITY
Start: 2025-02-28 | End: 1900-01-01

## 2025-02-28 RX ORDER — URSODIOL 250 MG/1
250 TABLET ORAL TWICE DAILY
Refills: 0 | Status: ACTIVE | COMMUNITY
Start: 2025-02-28

## 2025-02-28 RX ORDER — LEVOTHYROXINE SODIUM 0.15 MG/1
150 TABLET ORAL DAILY
Qty: 90 | Refills: 3 | Status: ACTIVE | COMMUNITY
Start: 2025-02-28 | End: 1900-01-01

## 2025-02-28 RX ORDER — PROPRANOLOL HYDROCHLORIDE 40 MG/1
40 TABLET ORAL 3 TIMES DAILY
Qty: 270 | Refills: 3 | Status: ACTIVE | COMMUNITY
Start: 2025-02-28 | End: 1900-01-01

## 2025-03-03 LAB
ALBUMIN SERPL ELPH-MCNC: 4.5 G/DL
ALP BLD-CCNC: 126 U/L
ALT SERPL-CCNC: 31 U/L
ANION GAP SERPL CALC-SCNC: 15 MMOL/L
AST SERPL-CCNC: 33 U/L
BASOPHILS # BLD AUTO: 0.03 K/UL
BASOPHILS NFR BLD AUTO: 0.6 %
BILIRUB SERPL-MCNC: 0.5 MG/DL
BUN SERPL-MCNC: 10 MG/DL
CALCIUM SERPL-MCNC: 9.7 MG/DL
CHLORIDE SERPL-SCNC: 104 MMOL/L
CHOLEST SERPL-MCNC: 226 MG/DL
CO2 SERPL-SCNC: 24 MMOL/L
CREAT SERPL-MCNC: 0.68 MG/DL
CRP SERPL-MCNC: <3 MG/L
EGFR: 94 ML/MIN/1.73M2
EOSINOPHIL # BLD AUTO: 0.26 K/UL
EOSINOPHIL NFR BLD AUTO: 5.5 %
ESTIMATED AVERAGE GLUCOSE: 151 MG/DL
GLUCOSE SERPL-MCNC: 117 MG/DL
HBA1C MFR BLD HPLC: 6.9 %
HCT VFR BLD CALC: 44.1 %
HCV AB SER QL: NONREACTIVE
HCV S/CO RATIO: 0.16 S/CO
HDLC SERPL-MCNC: 52 MG/DL
HGB BLD-MCNC: 14.6 G/DL
HIV1+2 AB SPEC QL IA.RAPID: NONREACTIVE
IMM GRANULOCYTES NFR BLD AUTO: 0.2 %
LDLC SERPL CALC-MCNC: 146 MG/DL
LYMPHOCYTES # BLD AUTO: 2.33 K/UL
LYMPHOCYTES NFR BLD AUTO: 49.5 %
MAN DIFF?: NORMAL
MCHC RBC-ENTMCNC: 28.7 PG
MCHC RBC-ENTMCNC: 33.1 G/DL
MCV RBC AUTO: 86.8 FL
MONOCYTES # BLD AUTO: 0.45 K/UL
MONOCYTES NFR BLD AUTO: 9.6 %
NEUTROPHILS # BLD AUTO: 1.63 K/UL
NEUTROPHILS NFR BLD AUTO: 34.6 %
NONHDLC SERPL-MCNC: 174 MG/DL
PLATELET # BLD AUTO: 192 K/UL
POTASSIUM SERPL-SCNC: 4.1 MMOL/L
PROT SERPL-MCNC: 7.6 G/DL
RBC # BLD: 5.08 M/UL
RBC # FLD: 13.3 %
SODIUM SERPL-SCNC: 142 MMOL/L
TRIGL SERPL-MCNC: 156 MG/DL
TSH SERPL-ACNC: 5.52 UIU/ML
WBC # FLD AUTO: 4.71 K/UL

## 2025-03-28 ENCOUNTER — APPOINTMENT (OUTPATIENT)
Dept: INTERNAL MEDICINE | Facility: CLINIC | Age: 70
End: 2025-03-28
Payer: MEDICARE

## 2025-03-28 VITALS
BODY MASS INDEX: 41.32 KG/M2 | HEIGHT: 65 IN | HEART RATE: 57 BPM | SYSTOLIC BLOOD PRESSURE: 160 MMHG | OXYGEN SATURATION: 97 % | TEMPERATURE: 98 F | WEIGHT: 248 LBS | DIASTOLIC BLOOD PRESSURE: 84 MMHG

## 2025-03-28 VITALS — SYSTOLIC BLOOD PRESSURE: 152 MMHG | DIASTOLIC BLOOD PRESSURE: 78 MMHG

## 2025-03-28 DIAGNOSIS — I10 ESSENTIAL (PRIMARY) HYPERTENSION: ICD-10-CM

## 2025-03-28 DIAGNOSIS — E78.5 HYPERLIPIDEMIA, UNSPECIFIED: ICD-10-CM

## 2025-03-28 DIAGNOSIS — K76.0 FATTY (CHANGE OF) LIVER, NOT ELSEWHERE CLASSIFIED: ICD-10-CM

## 2025-03-28 DIAGNOSIS — E11.9 TYPE 2 DIABETES MELLITUS W/OUT COMPLICATIONS: ICD-10-CM

## 2025-03-28 PROCEDURE — 99214 OFFICE O/P EST MOD 30 MIN: CPT

## 2025-03-28 PROCEDURE — G2211 COMPLEX E/M VISIT ADD ON: CPT

## 2025-03-28 RX ORDER — TIRZEPATIDE 2.5 MG/.5ML
2.5 INJECTION, SOLUTION SUBCUTANEOUS
Qty: 4 | Refills: 3 | Status: ACTIVE | COMMUNITY
Start: 2025-03-28 | End: 1900-01-01

## 2025-03-28 RX ORDER — ROSUVASTATIN CALCIUM 10 MG/1
10 TABLET, FILM COATED ORAL
Qty: 90 | Refills: 3 | Status: ACTIVE | COMMUNITY
Start: 2025-03-28 | End: 1900-01-01

## 2025-03-31 LAB
ALBUMIN SERPL ELPH-MCNC: 4.3 G/DL
ALBUMIN SERPL ELPH-MCNC: 4.5 G/DL
ALP BLD-CCNC: 113 U/L
ALT SERPL-CCNC: 37 U/L
ANION GAP SERPL CALC-SCNC: 13 MMOL/L
ANION GAP SERPL CALC-SCNC: 14 MMOL/L
AST SERPL-CCNC: 39 U/L
BILIRUB SERPL-MCNC: 0.6 MG/DL
BUN SERPL-MCNC: 13 MG/DL
BUN SERPL-MCNC: 13 MG/DL
CALCIUM SERPL-MCNC: 9.5 MG/DL
CALCIUM SERPL-MCNC: 9.7 MG/DL
CHLORIDE SERPL-SCNC: 106 MMOL/L
CHLORIDE SERPL-SCNC: 107 MMOL/L
CO2 SERPL-SCNC: 23 MMOL/L
CO2 SERPL-SCNC: 24 MMOL/L
CREAT SERPL-MCNC: 0.73 MG/DL
CREAT SERPL-MCNC: 0.75 MG/DL
EGFRCR SERPLBLD CKD-EPI 2021: 85 ML/MIN/1.73M2
EGFRCR SERPLBLD CKD-EPI 2021: 88 ML/MIN/1.73M2
GGT SERPL-CCNC: 50 U/L
GLUCOSE SERPL-MCNC: 130 MG/DL
GLUCOSE SERPL-MCNC: 131 MG/DL
MAGNESIUM SERPL-MCNC: 1.9 MG/DL
PHOSPHATE SERPL-MCNC: 3.2 MG/DL
POTASSIUM SERPL-SCNC: 3.7 MMOL/L
POTASSIUM SERPL-SCNC: 3.8 MMOL/L
PROT SERPL-MCNC: 7.4 G/DL
SODIUM SERPL-SCNC: 143 MMOL/L
SODIUM SERPL-SCNC: 144 MMOL/L

## 2025-04-17 ENCOUNTER — APPOINTMENT (OUTPATIENT)
Dept: ORTHOPEDIC SURGERY | Facility: CLINIC | Age: 70
End: 2025-04-17
Payer: MEDICARE

## 2025-04-17 VITALS
HEART RATE: 71 BPM | BODY MASS INDEX: 40.48 KG/M2 | HEIGHT: 65 IN | WEIGHT: 243 LBS | DIASTOLIC BLOOD PRESSURE: 69 MMHG | SYSTOLIC BLOOD PRESSURE: 171 MMHG

## 2025-04-17 DIAGNOSIS — M17.0 BILATERAL PRIMARY OSTEOARTHRITIS OF KNEE: ICD-10-CM

## 2025-04-17 PROCEDURE — 73562 X-RAY EXAM OF KNEE 3: CPT | Mod: LT

## 2025-04-17 PROCEDURE — 99204 OFFICE O/P NEW MOD 45 MIN: CPT

## 2025-04-18 PROBLEM — M17.0 PRIMARY OSTEOARTHRITIS OF BOTH KNEES: Status: ACTIVE | Noted: 2025-04-17

## 2025-04-21 ENCOUNTER — NON-APPOINTMENT (OUTPATIENT)
Age: 70
End: 2025-04-21

## 2025-04-21 ENCOUNTER — OUTPATIENT (OUTPATIENT)
Dept: OUTPATIENT SERVICES | Facility: HOSPITAL | Age: 70
LOS: 1 days | End: 2025-04-21
Payer: MEDICARE

## 2025-04-21 ENCOUNTER — APPOINTMENT (OUTPATIENT)
Dept: CT IMAGING | Facility: CLINIC | Age: 70
End: 2025-04-21

## 2025-04-21 DIAGNOSIS — N63.0 UNSPECIFIED LUMP IN UNSPECIFIED BREAST: ICD-10-CM

## 2025-04-21 DIAGNOSIS — M17.0 BILATERAL PRIMARY OSTEOARTHRITIS OF KNEE: ICD-10-CM

## 2025-04-21 PROCEDURE — 73700 CT LOWER EXTREMITY W/O DYE: CPT

## 2025-04-21 PROCEDURE — 73700 CT LOWER EXTREMITY W/O DYE: CPT | Mod: 26,LT

## 2025-04-23 DIAGNOSIS — K74.3 PRIMARY BILIARY CIRRHOSIS: ICD-10-CM

## 2025-04-25 ENCOUNTER — OUTPATIENT (OUTPATIENT)
Dept: OUTPATIENT SERVICES | Facility: HOSPITAL | Age: 70
LOS: 1 days | End: 2025-04-25

## 2025-04-25 VITALS
WEIGHT: 242.95 LBS | TEMPERATURE: 98 F | DIASTOLIC BLOOD PRESSURE: 82 MMHG | OXYGEN SATURATION: 97 % | SYSTOLIC BLOOD PRESSURE: 152 MMHG | RESPIRATION RATE: 16 BRPM | HEART RATE: 68 BPM | HEIGHT: 66.5 IN

## 2025-04-25 DIAGNOSIS — M17.12 UNILATERAL PRIMARY OSTEOARTHRITIS, LEFT KNEE: ICD-10-CM

## 2025-04-25 DIAGNOSIS — I10 ESSENTIAL (PRIMARY) HYPERTENSION: ICD-10-CM

## 2025-04-25 DIAGNOSIS — M19.90 UNSPECIFIED OSTEOARTHRITIS, UNSPECIFIED SITE: ICD-10-CM

## 2025-04-25 DIAGNOSIS — E11.9 TYPE 2 DIABETES MELLITUS WITHOUT COMPLICATIONS: ICD-10-CM

## 2025-04-25 DIAGNOSIS — Z90.710 ACQUIRED ABSENCE OF BOTH CERVIX AND UTERUS: Chronic | ICD-10-CM

## 2025-04-25 LAB
A1C WITH ESTIMATED AVERAGE GLUCOSE RESULT: 6.3 % — HIGH (ref 4–5.6)
ANION GAP SERPL CALC-SCNC: 13 MMOL/L — SIGNIFICANT CHANGE UP (ref 7–14)
APPEARANCE UR: ABNORMAL
BACTERIA # UR AUTO: ABNORMAL /HPF
BASOPHILS # BLD AUTO: 0.04 K/UL — SIGNIFICANT CHANGE UP (ref 0–0.2)
BASOPHILS NFR BLD AUTO: 0.7 % — SIGNIFICANT CHANGE UP (ref 0–2)
BILIRUB UR-MCNC: NEGATIVE — SIGNIFICANT CHANGE UP
BLD GP AB SCN SERPL QL: NEGATIVE — SIGNIFICANT CHANGE UP
BUN SERPL-MCNC: 16 MG/DL — SIGNIFICANT CHANGE UP (ref 7–23)
CALCIUM SERPL-MCNC: 9.6 MG/DL — SIGNIFICANT CHANGE UP (ref 8.4–10.5)
CAST: 2 /LPF — SIGNIFICANT CHANGE UP (ref 0–4)
CHLORIDE SERPL-SCNC: 107 MMOL/L — SIGNIFICANT CHANGE UP (ref 98–107)
CO2 SERPL-SCNC: 24 MMOL/L — SIGNIFICANT CHANGE UP (ref 22–31)
COLOR SPEC: YELLOW — SIGNIFICANT CHANGE UP
CREAT SERPL-MCNC: 0.8 MG/DL — SIGNIFICANT CHANGE UP (ref 0.5–1.3)
DIFF PNL FLD: NEGATIVE — SIGNIFICANT CHANGE UP
EGFR: 79 ML/MIN/1.73M2 — SIGNIFICANT CHANGE UP
EGFR: 79 ML/MIN/1.73M2 — SIGNIFICANT CHANGE UP
EOSINOPHIL # BLD AUTO: 0.26 K/UL — SIGNIFICANT CHANGE UP (ref 0–0.5)
EOSINOPHIL NFR BLD AUTO: 4.7 % — SIGNIFICANT CHANGE UP (ref 0–6)
ESTIMATED AVERAGE GLUCOSE: 134 — SIGNIFICANT CHANGE UP
GLUCOSE SERPL-MCNC: 95 MG/DL — SIGNIFICANT CHANGE UP (ref 70–99)
GLUCOSE UR QL: NEGATIVE MG/DL — SIGNIFICANT CHANGE UP
HCT VFR BLD CALC: 42 % — SIGNIFICANT CHANGE UP (ref 34.5–45)
HGB BLD-MCNC: 13.7 G/DL — SIGNIFICANT CHANGE UP (ref 11.5–15.5)
IMM GRANULOCYTES NFR BLD AUTO: 0.2 % — SIGNIFICANT CHANGE UP (ref 0–0.9)
KETONES UR-MCNC: NEGATIVE MG/DL — SIGNIFICANT CHANGE UP
LEUKOCYTE ESTERASE UR-ACNC: ABNORMAL
LYMPHOCYTES # BLD AUTO: 2.17 K/UL — SIGNIFICANT CHANGE UP (ref 1–3.3)
LYMPHOCYTES # BLD AUTO: 39.2 % — SIGNIFICANT CHANGE UP (ref 13–44)
MCHC RBC-ENTMCNC: 28.4 PG — SIGNIFICANT CHANGE UP (ref 27–34)
MCHC RBC-ENTMCNC: 32.6 G/DL — SIGNIFICANT CHANGE UP (ref 32–36)
MCV RBC AUTO: 87.1 FL — SIGNIFICANT CHANGE UP (ref 80–100)
MONOCYTES # BLD AUTO: 0.61 K/UL — SIGNIFICANT CHANGE UP (ref 0–0.9)
MONOCYTES NFR BLD AUTO: 11 % — SIGNIFICANT CHANGE UP (ref 2–14)
MRSA PCR RESULT.: SIGNIFICANT CHANGE UP
NEUTROPHILS # BLD AUTO: 2.45 K/UL — SIGNIFICANT CHANGE UP (ref 1.8–7.4)
NEUTROPHILS NFR BLD AUTO: 44.2 % — SIGNIFICANT CHANGE UP (ref 43–77)
NITRITE UR-MCNC: NEGATIVE — SIGNIFICANT CHANGE UP
PH UR: 6 — SIGNIFICANT CHANGE UP (ref 5–8)
PLATELET # BLD AUTO: 170 K/UL — SIGNIFICANT CHANGE UP (ref 150–400)
POTASSIUM SERPL-MCNC: 4 MMOL/L — SIGNIFICANT CHANGE UP (ref 3.5–5.3)
POTASSIUM SERPL-SCNC: 4 MMOL/L — SIGNIFICANT CHANGE UP (ref 3.5–5.3)
PROT UR-MCNC: SIGNIFICANT CHANGE UP MG/DL
RBC # BLD: 4.82 M/UL — SIGNIFICANT CHANGE UP (ref 3.8–5.2)
RBC # FLD: 13.1 % — SIGNIFICANT CHANGE UP (ref 10.3–14.5)
RBC CASTS # UR COMP ASSIST: 1 /HPF — SIGNIFICANT CHANGE UP (ref 0–4)
RH IG SCN BLD-IMP: POSITIVE — SIGNIFICANT CHANGE UP
RH IG SCN BLD-IMP: POSITIVE — SIGNIFICANT CHANGE UP
S AUREUS DNA NOSE QL NAA+PROBE: SIGNIFICANT CHANGE UP
SODIUM SERPL-SCNC: 144 MMOL/L — SIGNIFICANT CHANGE UP (ref 135–145)
SP GR SPEC: 1.02 — SIGNIFICANT CHANGE UP (ref 1–1.03)
SQUAMOUS # UR AUTO: 9 /HPF — HIGH (ref 0–5)
UROBILINOGEN FLD QL: 1 MG/DL — SIGNIFICANT CHANGE UP (ref 0.2–1)
WBC # BLD: 5.54 K/UL — SIGNIFICANT CHANGE UP (ref 3.8–10.5)
WBC # FLD AUTO: 5.54 K/UL — SIGNIFICANT CHANGE UP (ref 3.8–10.5)
WBC UR QL: 2 /HPF — SIGNIFICANT CHANGE UP (ref 0–5)

## 2025-04-25 RX ORDER — PROPRANOLOL HCL 60 MG
1 TABLET ORAL
Refills: 0 | DISCHARGE

## 2025-04-25 NOTE — H&P PST ADULT - PROBLEM SELECTOR PLAN 1
Pt scheduled for surgery and preop instructions including instructions for taking Famotidine and for Chlorhexidine use in showering on the day of surgery, given verbally and with use of  written materials, and patient confirming understanding of such instructions using  teach back method.  ERP instructions for Ortho pts given to pt and reviewed   [employer] HCP form given to pt to bring with own on DOS

## 2025-04-25 NOTE — H&P PST ADULT - MUSCULOSKELETAL
details… decreased ROM due to pain/strength 5/5 bilateral upper extremities/decreased strength decreased ROM due to pain/normal gait/strength 5/5 bilateral upper extremities/decreased strength

## 2025-04-25 NOTE — H&P PST ADULT - HISTORY OF PRESENT ILLNESS
Pt is a 70 yr old female scheduled for Left total Knee Replacement with [employer] with Dr Sears 5/14/25 - pt c/o of left and right knee pain , left worse with episode 2 yrs ago when knee gave out while walking - pt then began to have pain left knee 8/10 that was shooting 3 weeks ago - pt hx DMT2, HTN, HLD, sleep apnea, hypothyroid  and obesity   Pt on Mounjaro - LD 5/6/25   Pt is [employer] - will bring new HCP form with her on DOS

## 2025-04-25 NOTE — H&P PST ADULT - MUSCULOSKELETAL COMMENTS
B/L knee pain with left leg worse - pain began 3 weeks ago 8-9/10 - pt describes "shooting pain "  - pt first dx 2 yrs ago when knee gave out while walking

## 2025-04-25 NOTE — H&P PST ADULT - NSICDXPASTMEDICALHX_GEN_ALL_CORE_FT
PAST MEDICAL HISTORY:  DM (diabetes mellitus)     HLD (hyperlipidemia)     HTN (hypertension)     Osteoarthritis     Sleep apnea      PAST MEDICAL HISTORY:  DM (diabetes mellitus)     HLD (hyperlipidemia)     HTN (hypertension)     Hypothyroid     Obesity     Osteoarthritis     Sleep apnea

## 2025-04-26 PROBLEM — Z78.9 OTHER SPECIFIED HEALTH STATUS: Chronic | Status: INACTIVE | Noted: 2024-07-31 | Resolved: 2025-04-25

## 2025-04-27 LAB
CULTURE RESULTS: SIGNIFICANT CHANGE UP
SPECIMEN SOURCE: SIGNIFICANT CHANGE UP

## 2025-04-28 ENCOUNTER — NON-APPOINTMENT (OUTPATIENT)
Age: 70
End: 2025-04-28

## 2025-04-28 ENCOUNTER — APPOINTMENT (OUTPATIENT)
Dept: INTERNAL MEDICINE | Facility: CLINIC | Age: 70
End: 2025-04-28
Payer: MEDICARE

## 2025-04-28 ENCOUNTER — TRANSCRIPTION ENCOUNTER (OUTPATIENT)
Age: 70
End: 2025-04-28

## 2025-04-28 VITALS
TEMPERATURE: 97.5 F | BODY MASS INDEX: 40.48 KG/M2 | OXYGEN SATURATION: 97 % | DIASTOLIC BLOOD PRESSURE: 83 MMHG | SYSTOLIC BLOOD PRESSURE: 144 MMHG | HEIGHT: 65 IN | WEIGHT: 243 LBS | HEART RATE: 66 BPM

## 2025-04-28 VITALS — SYSTOLIC BLOOD PRESSURE: 142 MMHG | DIASTOLIC BLOOD PRESSURE: 78 MMHG

## 2025-04-28 DIAGNOSIS — M25.562 PAIN IN LEFT KNEE: ICD-10-CM

## 2025-04-28 DIAGNOSIS — Z01.818 ENCOUNTER FOR OTHER PREPROCEDURAL EXAMINATION: ICD-10-CM

## 2025-04-28 PROCEDURE — 99214 OFFICE O/P EST MOD 30 MIN: CPT

## 2025-04-30 ENCOUNTER — NON-APPOINTMENT (OUTPATIENT)
Age: 70
End: 2025-04-30

## 2025-05-01 PROBLEM — E78.5 HYPERLIPIDEMIA, UNSPECIFIED: Chronic | Status: ACTIVE | Noted: 2025-04-25

## 2025-05-01 PROBLEM — E03.9 HYPOTHYROIDISM, UNSPECIFIED: Chronic | Status: ACTIVE | Noted: 2025-04-25

## 2025-05-01 PROBLEM — G47.30 SLEEP APNEA, UNSPECIFIED: Chronic | Status: ACTIVE | Noted: 2025-04-25

## 2025-05-01 PROBLEM — M19.90 UNSPECIFIED OSTEOARTHRITIS, UNSPECIFIED SITE: Chronic | Status: ACTIVE | Noted: 2025-04-25

## 2025-05-01 PROBLEM — E11.9 TYPE 2 DIABETES MELLITUS WITHOUT COMPLICATIONS: Chronic | Status: ACTIVE | Noted: 2025-04-25

## 2025-05-01 PROBLEM — E66.9 OBESITY, UNSPECIFIED: Chronic | Status: ACTIVE | Noted: 2025-04-25

## 2025-05-01 PROBLEM — I10 ESSENTIAL (PRIMARY) HYPERTENSION: Chronic | Status: ACTIVE | Noted: 2025-04-25

## 2025-05-08 DIAGNOSIS — R92.8 OTHER ABNORMAL AND INCONCLUSIVE FINDINGS ON DIAGNOSTIC IMAGING OF BREAST: ICD-10-CM

## 2025-05-13 RX ORDER — TIRZEPATIDE 7.5 MG/.5ML
2.5 INJECTION, SOLUTION SUBCUTANEOUS
Refills: 0 | DISCHARGE

## 2025-05-13 NOTE — ASU PATIENT PROFILE, ADULT - NSICDXPASTMEDICALHX_GEN_ALL_CORE_FT
PAST MEDICAL HISTORY:  DM (diabetes mellitus)     HLD (hyperlipidemia)     HTN (hypertension)     Hypothyroid     Obesity     Osteoarthritis     Sleep apnea

## 2025-05-13 NOTE — ASU PATIENT PROFILE, ADULT - FALL HARM RISK - HARM RISK INTERVENTIONS

## 2025-05-13 NOTE — ASU PATIENT PROFILE, ADULT - ABLE TO REACH PT
33 year old male presents to ED c/o abscess on right hip x2 days with worsening pain, patient states likely occurred after shaving. Patient states attempted to drain, blood and pus came out.
yes

## 2025-05-14 ENCOUNTER — RESULT REVIEW (OUTPATIENT)
Age: 70
End: 2025-05-14

## 2025-05-14 ENCOUNTER — INPATIENT (INPATIENT)
Facility: HOSPITAL | Age: 70
LOS: 0 days | Discharge: HOME HEALTH | End: 2025-05-15
Attending: ORTHOPAEDIC SURGERY | Admitting: ORTHOPAEDIC SURGERY
Payer: MEDICARE

## 2025-05-14 ENCOUNTER — TRANSCRIPTION ENCOUNTER (OUTPATIENT)
Age: 70
End: 2025-05-14

## 2025-05-14 ENCOUNTER — APPOINTMENT (OUTPATIENT)
Dept: ORTHOPEDIC SURGERY | Facility: HOSPITAL | Age: 70
End: 2025-05-14

## 2025-05-14 VITALS
RESPIRATION RATE: 16 BRPM | HEART RATE: 57 BPM | WEIGHT: 242.95 LBS | OXYGEN SATURATION: 100 % | SYSTOLIC BLOOD PRESSURE: 145 MMHG | DIASTOLIC BLOOD PRESSURE: 70 MMHG | TEMPERATURE: 98 F | HEIGHT: 66 IN

## 2025-05-14 DIAGNOSIS — M17.12 UNILATERAL PRIMARY OSTEOARTHRITIS, LEFT KNEE: ICD-10-CM

## 2025-05-14 DIAGNOSIS — Z90.710 ACQUIRED ABSENCE OF BOTH CERVIX AND UTERUS: Chronic | ICD-10-CM

## 2025-05-14 LAB
GLUCOSE BLDC GLUCOMTR-MCNC: 127 MG/DL — HIGH (ref 70–99)
GLUCOSE BLDC GLUCOMTR-MCNC: 82 MG/DL — SIGNIFICANT CHANGE UP (ref 70–99)
GLUCOSE BLDC GLUCOMTR-MCNC: 84 MG/DL — SIGNIFICANT CHANGE UP (ref 70–99)

## 2025-05-14 PROCEDURE — S2900 ROBOTIC SURGICAL SYSTEM: CPT | Mod: NC

## 2025-05-14 PROCEDURE — 20985 CPTR-ASST DIR MS PX: CPT

## 2025-05-14 PROCEDURE — 73562 X-RAY EXAM OF KNEE 3: CPT | Mod: 26,LT

## 2025-05-14 PROCEDURE — 27447 TOTAL KNEE ARTHROPLASTY: CPT | Mod: LT

## 2025-05-14 PROCEDURE — 0055T BONE SRGRY CMPTR CT/MRI IMAG: CPT

## 2025-05-14 DEVICE — MAKO BONE PIN 3.2MM X 110MM: Type: IMPLANTABLE DEVICE | Site: LEFT | Status: FUNCTIONAL

## 2025-05-14 DEVICE — COMP FEM CR CMNTLSS BEADED W/ PA SZ 4 LT: Type: IMPLANTABLE DEVICE | Site: LEFT | Status: FUNCTIONAL

## 2025-05-14 DEVICE — INSERT TIB BEARING CS X3 SZ 4 9MM: Type: IMPLANTABLE DEVICE | Site: LEFT | Status: FUNCTIONAL

## 2025-05-14 DEVICE — MAKO BONE PIN 3.2MM X 140MM: Type: IMPLANTABLE DEVICE | Site: LEFT | Status: FUNCTIONAL

## 2025-05-14 DEVICE — BASEPLATE TIB TRIATHLON TRITAN SZ 4: Type: IMPLANTABLE DEVICE | Site: LEFT | Status: FUNCTIONAL

## 2025-05-14 DEVICE — PATELLA ASYMM TRIATHLON SZ A 32X10MM: Type: IMPLANTABLE DEVICE | Site: LEFT | Status: FUNCTIONAL

## 2025-05-14 RX ORDER — DEXTROSE 50 % IN WATER 50 %
25 SYRINGE (ML) INTRAVENOUS ONCE
Refills: 0 | Status: DISCONTINUED | OUTPATIENT
Start: 2025-05-14 | End: 2025-05-15

## 2025-05-14 RX ORDER — CELECOXIB 50 MG/1
200 CAPSULE ORAL EVERY 12 HOURS
Refills: 0 | Status: CANCELLED | OUTPATIENT
Start: 2025-05-17 | End: 2025-05-15

## 2025-05-14 RX ORDER — SODIUM CHLORIDE 9 G/1000ML
1000 INJECTION, SOLUTION INTRAVENOUS
Refills: 0 | Status: DISCONTINUED | OUTPATIENT
Start: 2025-05-14 | End: 2025-05-15

## 2025-05-14 RX ORDER — ASPIRIN 325 MG
81 TABLET ORAL
Refills: 0 | Status: DISCONTINUED | OUTPATIENT
Start: 2025-05-14 | End: 2025-05-15

## 2025-05-14 RX ORDER — LOSARTAN POTASSIUM 100 MG/1
1 TABLET, FILM COATED ORAL
Refills: 0 | DISCHARGE

## 2025-05-14 RX ORDER — OXYCODONE HYDROCHLORIDE 30 MG/1
10 TABLET ORAL EVERY 4 HOURS
Refills: 0 | Status: DISCONTINUED | OUTPATIENT
Start: 2025-05-14 | End: 2025-05-15

## 2025-05-14 RX ORDER — HYDROMORPHONE/SOD CHLOR,ISO/PF 2 MG/10 ML
0.5 SYRINGE (ML) INJECTION ONCE
Refills: 0 | Status: DISCONTINUED | OUTPATIENT
Start: 2025-05-14 | End: 2025-05-14

## 2025-05-14 RX ORDER — POLYETHYLENE GLYCOL 3350 17 G/17G
17 POWDER, FOR SOLUTION ORAL AT BEDTIME
Refills: 0 | Status: DISCONTINUED | OUTPATIENT
Start: 2025-05-14 | End: 2025-05-15

## 2025-05-14 RX ORDER — ACETAMINOPHEN 500 MG/5ML
1000 LIQUID (ML) ORAL ONCE
Refills: 0 | Status: COMPLETED | OUTPATIENT
Start: 2025-05-15 | End: 2025-05-15

## 2025-05-14 RX ORDER — ROSUVASTATIN CALCIUM 20 MG/1
10 TABLET, FILM COATED ORAL AT BEDTIME
Refills: 0 | Status: DISCONTINUED | OUTPATIENT
Start: 2025-05-14 | End: 2025-05-15

## 2025-05-14 RX ORDER — GLUCAGON 3 MG/1
1 POWDER NASAL ONCE
Refills: 0 | Status: DISCONTINUED | OUTPATIENT
Start: 2025-05-14 | End: 2025-05-15

## 2025-05-14 RX ORDER — URSODIOL 300 MG/1
250 CAPSULE ORAL
Refills: 0 | Status: DISCONTINUED | OUTPATIENT
Start: 2025-05-14 | End: 2025-05-15

## 2025-05-14 RX ORDER — DEXTROSE 50 % IN WATER 50 %
12.5 SYRINGE (ML) INTRAVENOUS ONCE
Refills: 0 | Status: DISCONTINUED | OUTPATIENT
Start: 2025-05-14 | End: 2025-05-15

## 2025-05-14 RX ORDER — POVIDONE-IODINE 7.5 %
1 SOLUTION, NON-ORAL TOPICAL ONCE
Refills: 0 | Status: DISCONTINUED | OUTPATIENT
Start: 2025-05-14 | End: 2025-05-14

## 2025-05-14 RX ORDER — MAGNESIUM HYDROXIDE 400 MG/5ML
30 SUSPENSION ORAL DAILY
Refills: 0 | Status: DISCONTINUED | OUTPATIENT
Start: 2025-05-14 | End: 2025-05-15

## 2025-05-14 RX ORDER — AMLODIPINE BESYLATE 10 MG/1
1 TABLET ORAL
Refills: 0 | DISCHARGE

## 2025-05-14 RX ORDER — KETOROLAC TROMETHAMINE 30 MG/ML
15 INJECTION, SOLUTION INTRAMUSCULAR; INTRAVENOUS EVERY 6 HOURS
Refills: 0 | Status: DISCONTINUED | OUTPATIENT
Start: 2025-05-15 | End: 2025-05-15

## 2025-05-14 RX ORDER — LEVOTHYROXINE SODIUM 300 MCG
1 TABLET ORAL
Refills: 0 | DISCHARGE

## 2025-05-14 RX ORDER — INSULIN LISPRO 100 U/ML
INJECTION, SOLUTION INTRAVENOUS; SUBCUTANEOUS
Refills: 0 | Status: DISCONTINUED | OUTPATIENT
Start: 2025-05-14 | End: 2025-05-15

## 2025-05-14 RX ORDER — PROPRANOLOL HCL 60 MG
1 TABLET ORAL
Refills: 0 | DISCHARGE

## 2025-05-14 RX ORDER — CEFAZOLIN SODIUM IN 0.9 % NACL 3 G/100 ML
2000 INTRAVENOUS SOLUTION, PIGGYBACK (ML) INTRAVENOUS EVERY 8 HOURS
Refills: 0 | Status: COMPLETED | OUTPATIENT
Start: 2025-05-15 | End: 2025-05-15

## 2025-05-14 RX ORDER — LEVOTHYROXINE SODIUM 300 MCG
175 TABLET ORAL DAILY
Refills: 0 | Status: DISCONTINUED | OUTPATIENT
Start: 2025-05-14 | End: 2025-05-15

## 2025-05-14 RX ORDER — OXYCODONE HYDROCHLORIDE 30 MG/1
5 TABLET ORAL EVERY 4 HOURS
Refills: 0 | Status: DISCONTINUED | OUTPATIENT
Start: 2025-05-14 | End: 2025-05-15

## 2025-05-14 RX ORDER — ACETAMINOPHEN 500 MG/5ML
975 LIQUID (ML) ORAL EVERY 8 HOURS
Refills: 0 | Status: DISCONTINUED | OUTPATIENT
Start: 2025-05-16 | End: 2025-05-15

## 2025-05-14 RX ORDER — AMLODIPINE BESYLATE 10 MG/1
10 TABLET ORAL AT BEDTIME
Refills: 0 | Status: DISCONTINUED | OUTPATIENT
Start: 2025-05-14 | End: 2025-05-15

## 2025-05-14 RX ORDER — ROSUVASTATIN CALCIUM 20 MG/1
1 TABLET, FILM COATED ORAL
Refills: 0 | DISCHARGE

## 2025-05-14 RX ORDER — ONDANSETRON HCL/PF 4 MG/2 ML
4 VIAL (ML) INJECTION EVERY 6 HOURS
Refills: 0 | Status: DISCONTINUED | OUTPATIENT
Start: 2025-05-14 | End: 2025-05-15

## 2025-05-14 RX ORDER — URSODIOL 300 MG/1
1 CAPSULE ORAL
Refills: 0 | DISCHARGE

## 2025-05-14 RX ORDER — DEXTROSE 50 % IN WATER 50 %
15 SYRINGE (ML) INTRAVENOUS ONCE
Refills: 0 | Status: DISCONTINUED | OUTPATIENT
Start: 2025-05-14 | End: 2025-05-15

## 2025-05-14 RX ORDER — LOSARTAN POTASSIUM 100 MG/1
100 TABLET, FILM COATED ORAL DAILY
Refills: 0 | Status: DISCONTINUED | OUTPATIENT
Start: 2025-05-14 | End: 2025-05-15

## 2025-05-14 RX ORDER — INSULIN LISPRO 100 U/ML
INJECTION, SOLUTION INTRAVENOUS; SUBCUTANEOUS AT BEDTIME
Refills: 0 | Status: DISCONTINUED | OUTPATIENT
Start: 2025-05-14 | End: 2025-05-15

## 2025-05-14 RX ORDER — TRAMADOL HYDROCHLORIDE 50 MG/1
50 TABLET, FILM COATED ORAL ONCE
Refills: 0 | Status: DISCONTINUED | OUTPATIENT
Start: 2025-05-14 | End: 2025-05-14

## 2025-05-14 RX ORDER — TRAMADOL HYDROCHLORIDE 50 MG/1
50 TABLET, FILM COATED ORAL EVERY 6 HOURS
Refills: 0 | Status: DISCONTINUED | OUTPATIENT
Start: 2025-05-14 | End: 2025-05-15

## 2025-05-14 RX ORDER — SENNA 187 MG
2 TABLET ORAL AT BEDTIME
Refills: 0 | Status: DISCONTINUED | OUTPATIENT
Start: 2025-05-14 | End: 2025-05-15

## 2025-05-14 RX ADMIN — TRAMADOL HYDROCHLORIDE 50 MILLIGRAM(S): 50 TABLET, FILM COATED ORAL at 15:22

## 2025-05-14 RX ADMIN — OXYCODONE HYDROCHLORIDE 10 MILLIGRAM(S): 30 TABLET ORAL at 19:56

## 2025-05-14 RX ADMIN — POLYETHYLENE GLYCOL 3350 17 GRAM(S): 17 POWDER, FOR SOLUTION ORAL at 21:17

## 2025-05-14 RX ADMIN — URSODIOL 250 MILLIGRAM(S): 300 CAPSULE ORAL at 21:16

## 2025-05-14 RX ADMIN — OXYCODONE HYDROCHLORIDE 10 MILLIGRAM(S): 30 TABLET ORAL at 20:45

## 2025-05-14 RX ADMIN — Medication 40 MILLIGRAM(S): at 15:22

## 2025-05-14 RX ADMIN — Medication 1 APPLICATION(S): at 15:21

## 2025-05-14 RX ADMIN — Medication 0.5 MILLIGRAM(S): at 21:15

## 2025-05-14 RX ADMIN — Medication 500 MILLILITER(S): at 19:24

## 2025-05-14 RX ADMIN — Medication 500 MILLILITER(S): at 21:17

## 2025-05-14 RX ADMIN — AMLODIPINE BESYLATE 10 MILLIGRAM(S): 10 TABLET ORAL at 21:15

## 2025-05-14 RX ADMIN — ROSUVASTATIN CALCIUM 10 MILLIGRAM(S): 20 TABLET, FILM COATED ORAL at 21:15

## 2025-05-14 RX ADMIN — Medication 0.5 MILLIGRAM(S): at 22:00

## 2025-05-14 RX ADMIN — Medication 2 TABLET(S): at 21:15

## 2025-05-14 NOTE — DISCHARGE NOTE PROVIDER - NSDCMRMEDTOKEN_GEN_ALL_CORE_FT
amLODIPine 10 mg oral tablet: 1 tab(s) orally once a day (at bedtime)  Client takes Supplements : last dose 5/7/25:   losartan 100 mg oral tablet: 1 tab(s) orally once a day am  Mounjaro 2.5 mg/0.5 mL subcutaneous solution: 2.5 milligram(s) subcutaneously once a week Tuesdays, Last dose 5/6/25  propranolol 40 mg oral tablet: 1 tab(s) orally 2 tabs am,1 tab, pm  rosuvastatin 10 mg oral tablet: 1 tab(s) orally once a day (at bedtime)  Synthroid 175 mcg (0.175 mg) oral tablet: 1 tab(s) orally once a day  ursodiol 250 mg oral tablet: 1 tab(s) orally 2 times a day   acetaminophen 325 mg oral tablet: 3 tab(s) orally every 8 hours  amLODIPine 10 mg oral tablet: 1 tab(s) orally once a day (at bedtime)  aspirin 81 mg oral delayed release tablet: 1 tab(s) orally 2 times a day  celecoxib 200 mg oral capsule: 1 cap(s) orally every 12 hours  losartan 100 mg oral tablet: 1 tab(s) orally once a day am  Mounjaro 2.5 mg/0.5 mL subcutaneous solution: 2.5 milligram(s) subcutaneously once a week Tuesdays, Last dose 5/6/25  Narcan 4 mg/0.1 mL nasal spray: 1 spray(s) intranasally once a day  oxyCODONE 5 mg oral tablet: 1 tab(s) orally every 6 hours as needed for Moderate Pain (4 - 6) MDD: 4  pantoprazole 40 mg oral delayed release tablet: 1 tab(s) orally once a day (before a meal)  polyethylene glycol 3350 oral powder for reconstitution: 17 gram(s) orally once a day (at bedtime)  propranolol 40 mg oral tablet: 1 tab(s) orally 2 tabs am,1 tab, pm  rosuvastatin 10 mg oral tablet: 1 tab(s) orally once a day (at bedtime)  senna leaf extract oral tablet: 2 tab(s) orally once a day (at bedtime)  Synthroid 175 mcg (0.175 mg) oral tablet: 1 tab(s) orally once a day  traMADol 50 mg oral tablet: 1 tab(s) orally every 6 hours as needed for Mild Pain (1 - 3) MDD: 4  ursodiol 250 mg oral tablet: 1 tab(s) orally 2 times a day

## 2025-05-14 NOTE — ASU PREOP CHECKLIST - ADVANCE DIRECTIVE ADDRESSED/READDRESSED
DISCHARGE SUMMARY     DATE OF DISCHARGE: 07/11/2020    DISCHARGE DESTINATION: Home with family. FACILITY  Patient does not appear to have any needs that require outside medical intervention at this time. Patient has no therapy needs. TRANSPORTATION: Family will transport    Name:  Pato Fernandez  Relationship:    Time: TBD by patient, family and medical staff. Phone Number: [phone]     COMMENTS: SW spoke with patient via telephone today. She stated that she was waiting to see the nephrologist but if he is ok with her being discharged to home she will also be ok with this decision. No further needs noted unless indicated by patient, family and or medical staff. Respectfully submitted,    MICHAEL Garza  [facility]   [phone]    Electronically signed by MICHAEL Louise on 7/11/2020 at 12:17 PM done

## 2025-05-14 NOTE — PROGRESS NOTE ADULT - ASSESSMENT
A/P: 70y y/o Female s/p L total knee arthroplasty, POD #0  - Pain control  - Antibiotic - Ancef postop  - Incentive Spirometry  - DVT prophylaxis: Venodynes/Aspirin 81mg BID  - F/U AM Labs  - PT/OT  - WBAT  - Notify Orthopedics with any questions

## 2025-05-14 NOTE — DISCHARGE NOTE PROVIDER - HOSPITAL COURSE
This is a 71yo Female with PMH of HTN, HLD, DM2, hypothyroidism who presents to [facility] for orthopedic surgery. Patient s/p L TKA with Dr. Sears on 5/14/25. Patient tolerated the procedure well without any intraoperative complications. Patient tolerated physical therapy well, and the pain was controlled. Patient is weight bearing as tolerated with cane/walker as needed. Seen by medical attending for continuity of care and management and cleared for safe discharge. Keep dressing/incision clean, dry and intact. Any suture/staples to be removed on post-op day #14 at your office visit. Patient is on 81 mg of ASA BID for DVT prophylaxis, please take for 6 weeks unless otherwise instructed by your surgeon. Please follow up with Dr. Sears in 2 weeks, call the office to make an appointment. Please follow up with your PMD for continuity of care and management as medications may have changed.

## 2025-05-14 NOTE — DISCHARGE NOTE PROVIDER - NSDCCPCAREPLAN_GEN_ALL_CORE_FT
PRINCIPAL DISCHARGE DIAGNOSIS  Diagnosis: Osteoarthritis of left knee  Assessment and Plan of Treatment: Diet: Continue regular diet upon discharge.   Activity: No heavy lifting > 25 lbs for 4 weeks. Avoid straining or excessive activity x 6 weeks.  -No showering until post-operative day 5.  -Continue to use your walker when ambulating until your postoperative follow up appointment.   Dressings: Keep dressing clean, dry, and intact. Your doctor will remove your bandage at your post-operative follow up appointment.   Other Care:   -You may shower when you get home but DO NOT soak dressing and/or incision. The water may run over your dressing/incision but DO NOT let the water directly hit your dressing/incision (take a shower with your wound away from the direct stream of water). NO hot tubes, NO bath tubs, NO swimming pools.   -Elevate your operative leg 2 feet above heart level for 2 hours in the morning, 2 hours in the afternoon, and 2 hours in the evening.   -Apply ice for 20min every time you elevate.   -Sit for 90 min/day: 45mins x2 or 30min x3  -DO NOT sit for more than the 90min/day. Walk or lay down when not elevating your leg.   -DO NOT place the elevation pillow behind your knees. Only place it under your calf and heel.   -DO NOT bend more than 45 degrees at the waist

## 2025-05-14 NOTE — DISCHARGE NOTE PROVIDER - NSDCHHASSISTDEVIC_GEN_ALL_CORE_FT
Progress Note - Aaron Lawson 1954, 71 y o  female MRN: [id number]    Unit/Bed#: -01 Encounter: [id number]    Primary Care Provider: No primary care provider on file  Date and time admitted to hospital: 11/30/2018  5:50 PM    PAD (peripheral artery disease) [facility])   Assessment & Plan    50-year-old female smoker w/hx HTN, HLD, active DLBC lymphoma on chemo @ [place], new onset PAF on Xarelto and severe aortoiliac and infrainguinal arterial occlusive disease, s/p aorto-bifemoral bypass graft '10 and bilateral fem-BK pop bypass w/vein (R '13, L '16) @ [facility] who presents w/LLE numbness and mild motor changes x 2 weeks and lactic acidosis  Diagnostics:  -limited ANITA 11/30:  Patent L fem-pop bypass graft with >75% stenosis proximal to inflow anastomosis  -CTA LLE 11/30:  Patent L fem-pop bypass graft w/2V AT, PT runoff,  2 aneurysms proximal portion of graft with short-segment high-grade 90% stenosis  No acute thrombus or occlusion    Plan:  -no acute vascular intervention indicated  Will require abdominal aortogram with left lower extremity runoff for assessment and possible intervention L fem-pop bypass graft (in Hybrid room @ [facility]) which can be performed as outpatient within next week  Scheduling to be readdressed on Monday  -no evidence of acute lower extremity ischemia, embolic event    Symptoms consistent with chronic ischemia  -LLE symptoms not source of lactic acidosis  -continue to trend lactate and continue medical workup per primary service  -no break in anticoagulation therapy identified as last dose of Xarelto 11/29/18 on day prior to admission  -continue ASA and statin therapy  -heparin drip at the discretion of cardiology related to PAF  -will need to hold Xarelto 48 hrs prior to scheduled angiogram  -will hold ACE 1 day prior and day of angiogram  -discussed at length with Dr Amrita Lo and CTA images reviewed by Dr Amrita Lo  -d/w Dr Loreto Domínguez     Elevated lactic acid level   Assessment & Plan    -lactate 2 3--> 3 1--> 2 4--> 2 3  -asymptomatic w/stable hemodynamics  -unclear etiology  -no evidence of acute LE ischemia, mesenteric ischemia or embolic event  -continue workup per primary service  -lactic acid levels trending down today     Paroxysmal atrial fibrillation [facility])   Assessment & Plan    New onset PAF dx'd Sept '18 @ [facility]   -preserved EF 60% w/o RWMA  -on Xarelto which is usually held 3 days prior to chemotherapy  Last dose 11/29/18  -continue heparin drip  -LLE symptoms do not appear acute or related to embolic event  No evidence of LLE thrombosis or graft occlusion  -continue rate control  -cardiology following     Lymphoma ([facility] [address] )   Assessment & Plan    Diffuse large B-cell lymphoma  -on chemotherapy q 3 weeks   -followed at [facility]  -continue management per Oncology     Essential hypertension   Assessment & Plan    -continue current medical regimen  -management per primary service           Consulting Service: SLIM    Chief Complaint:  Left lower extremity numbness, weakness x2 weeks    HPI: Arianna Fonseca is a 71 y o  female smoker w/hx HTN, HLD, nicotine dependence, active diffuse large B-cell lymphoma on chemotherapy Q 3 weeks followed by [facility], new onset PAF Sept '18 on Xarelto and severe aortoiliac and infrainguinal arterial occlusive disease, s/p aorto-bifemoral bypass graft '10 and bilateral fem-BK pop bypass grafts w/vein (R '13, L '16) @ [facility] who presents w/LLE numbness, weakness and mild motor changes x 2 weeks and lactic acidosis  Limited ANITA in the emergency room on 11/30/2018 has been reviewed and demonstrates widely patent L fem-pop bypass graft with >75% stenosis proximal to inflow anastomosis  CTA of the left lower extremity on 11/30/2018 has been reviewed and demonstrates patent L fem-popliteal bypass graft with 2 vessel AT/PT runoff, 2 graft aneurysms in the proximal thigh and short-segment high-grade 90% stenosis of proximal graft    No evidence of graft occlusion or acute thrombus  Lactate 2 3 on admission and increasing to 3 1 last p m  Now trending down at 2 4 this afternoon  The patient had been previously followed by Dr Vale Gabriel @ [facility] related to her vascular disease until he recently retired  She is scheduled for surveillance follow-up appointment with Dr Dottie Norton on 12/19/2018  Patient reports development LLE numbness affecting the entire leg and foot that developed sometime before Thanksgiving and has persisted  She denies rest pain, claudication or tissue loss  She feels left lower extremities week due to the inability to feel her leg under her when she walks  On exam, sensation is intact but slight decrease in motor/ROM of left ankle  Patient denies abdominal pain, back pain, postprandial abdominal pain, unexplained weight loss, food fear  Patient has been on Xarelto related to new onset PAF diagnosis [facility] September 2018  She has been instructed to hold her Xarelto the 3 days prior to scheduled chemotherapy sessions  Her last dose of Xarelto was on 11/29/18, 1 day prior to admission        Review of Systems:  General: negative  Cardiovascular: no chest pain or dyspnea on exertion  Respiratory: no cough, shortness of breath, or wheezing  Gastrointestinal: no abdominal pain, change in bowel habits, or black or bloody stools  Genitourinary ROS: no dysuria, trouble voiding, or hematuria  Musculoskeletal ROS:  As per the above HPI  Neurological ROS: no TIA or stroke symptoms  Hematological and Lymphatic ROS:  +lymphoma  Dermatological ROS: negative  Psychological ROS: negative  Ophthalmic ROS: negative  ENT ROS: negative    Past Medical History:  Past Medical History:   Diagnosis Date    Bell's palsy     Cancer ([address] )     lymphoma    Hypertension     PAD (peripheral artery disease) ([address] )     PVD (peripheral vascular disease) ([place] [address] )        Past Surgical History:  Past Surgical History:   Procedure Laterality Date    CARDIAC SURGERY      CARPAL TUNNEL RELEASE      HYSTERECTOMY      TONSILLECTOMY         Social History:  History   Alcohol Use No     History   Drug Use No     History   Smoking Status    Current Some Day Smoker    Packs/day: 0 25    Years: 40 00   Smokeless Tobacco    Never Used       Family History:  Family History   Problem Relation Age of Onset   [facility] Cancer Mother     Breast cancer Mother     Heart attack Father        Allergies:   Allergies   Allergen Reactions    Oxycodone-Acetaminophen Rash       Medications:  Current Facility-Administered Medications   Medication Dose Route Frequency    amLODIPine (NORVASC) tablet 10 mg  10 mg Oral Daily    aspirin chewable tablet 81 mg  81 mg Oral Daily    atorvastatin (LIPITOR) tablet 40 mg  40 mg Oral HS    calcium carbonate (TUMS) chewable tablet 1,000 mg  1,000 mg Oral Daily PRN    ciprofloxacin (CIPRO) tablet 500 mg  500 mg Oral Q12H [facility]    docusate sodium (COLACE) capsule 100 mg  100 mg Oral BID    escitalopram (LEXAPRO) tablet 5 mg  5 mg Oral Daily    lisinopril (ZESTRIL) tablet 20 mg  20 mg Oral Daily    magnesium oxide (MAG-OX) tablet 400 mg  400 mg Oral BID    magnesium sulfate IVPB (premix) SOLN 1 g  1 g Intravenous Once    melatonin tablet 6 mg  6 mg Oral HS    metoprolol succinate (TOPROL-XL) 24 hr tablet 100 mg  100 mg Oral Daily    mirtazapine (REMERON) tablet 7 5 mg  7 5 mg Oral HS    nicotine (NICODERM CQ) 7 mg/24hr TD 24 hr patch 7 mg  7 mg Transdermal Daily    ondansetron (ZOFRAN) injection 4 mg  4 mg Intravenous Q6H PRN    pantoprazole (PROTONIX) EC tablet 20 mg  20 mg Oral BID AC    rivaroxaban (XARELTO) tablet 10 mg  10 mg Oral Daily With Dinner    senna-docusate sodium (SENOKOT S) 8 6-50 mg per tablet 1 tablet  1 tablet Oral Daily    sodium chloride 0 9 % infusion  125 mL/hr Intravenous Continuous       Vitals:  /57 (BP Location: Left arm)   Pulse 84   Temp 98 5 °F (36 9 °C) (Oral)   Resp 17   Ht 5' 2" (1 575 m)   Wt 50 3 kg (110 lb 14 3 oz) SpO2 90%   BMI 20 28 kg/m²     I/Os:  I/O last 24 hours: In: -   Out: 750 [Urine:750]    Lab Results and Cultures:   Lab Results   Component Value Date    WBC 8 01 12/01/2018    HGB 8 3 (L) 12/01/2018    HCT 26 3 (L) 12/01/2018    MCV 99 (H) 12/01/2018     12/01/2018     Lab Results   Component Value Date    GLUCOSE 118 11/30/2018    CALCIUM 8 8 12/01/2018    K 4 4 12/01/2018    CO2 28 12/01/2018     12/01/2018    BUN 3 (L) 12/01/2018    CREATININE 0 66 12/01/2018     Lab Results   Component Value Date    INR 1 30 (H) 11/30/2018    INR 1 38 (H) 10/09/2018    PROTIME 16 0 (H) 11/30/2018    PROTIME 16 8 (H) 10/09/2018       Lipid Panel: No results found for: CHOL,     Blood Culture: No results found for: BLOODCX,   Urinalysis:   Lab Results   Component Value Date    COLORU Light Yellow 11/30/2018    CLARITYU Clear 11/30/2018    SPECGRAV 1 010 11/30/2018    PHUR 7 5 11/30/2018    LEUKOCYTESUR Trace (A) 11/30/2018    NITRITE Negative 11/30/2018    GLUCOSEU Negative 11/30/2018    KETONESU Negative 11/30/2018    BILIRUBINUR Negative 11/30/2018    BLOODU Negative 11/30/2018   ,   Urine Culture: No results found for: URINECX,   Wound Culure: No results found for: WOUNDCULT    Imaging:  Limited ANITA 11/30/2018:  Imaging study reviewed and as described above  See full report below:  FINDINGS:  Left                   Impression  PSV  EDV    Inflow Anastomosis     >75%        731  240    High Thigh (Graft)                  19    8    Mid Thigh (Graft)                   38         Low Thigh (Graft)                   32   12    Outflow Anastomosis                 22    8    Common Femoral Artery               33         Prox Profunda                       18    4    Prox SFA                             0    0    Proximal Pop                        22    8    Distal Pop                          21    5    Prox Post Tibial                    25    7    Dist Post Tibial                    20    5    Prox   Ant  Tibial                   28    5    Dist  Ant  Tibial                   12    0    Prox Peroneal                       22    6       CONCLUSION:  LEFT LOWER LIMB:  Widely patent common femoral artery to proximal popliteal artery bypass graft  but with signs of significant stenosis just proximal to the anastomosis  Stenosis is greater than 75% with post stenotic turbulence  Segmental pressures were refused due to patients pain  CTA LLE 11/30/2018: Final report pending  Images reviewed and reviewed with Dr Sarah Edwards  Results reviewed by Dr Kriss Hernández  Patent left femoral-popliteal bypass graft without acute occlusion or thrombus  Two vessel runoff via AT, PT  To aneurysms of the proximal graft with short-segment high-grade stenosis     TTE 12/1/18:  Pending  TTE in September w/preserved EF 60% without regional wall motion abnormality      Physical Exam:    General appearance: alert and oriented, in no acute distress  Skin: Skin color, texture, turgor normal  No rashes or lesions  Neurologic: Grossly normal  Head: Normocephalic, without obvious abnormality, atraumatic  Eyes: PERRL, EOMI, sclerae nonicteric  Throat: lips, mucosa, and tongue normal; teeth and gums normal  Neck: no adenopathy, no carotid bruit, no JVD, supple, symmetrical, trachea midline and thyroid not enlarged, symmetric, no tenderness/mass/nodules  Back: symmetric, no curvature  ROM normal  No CVA tenderness  Lungs: clear to auscultation bilaterally  Chest wall: no tenderness  Heart: regular rate and rhythm, S1, S2 normal, no murmur, click, rub or gallop  Abdomen: soft, non-tender; bowel sounds normal; no masses,  no organomegaly and Nondistended  No abdominal bruits  Well-healed midline surgical scar  Extremities: extremities normal, warm and well-perfused; no cyanosis, clubbing, or edema, no ulcers, gangrene or trophic changes and Left lower extremity warm, pink and sensory intact    Left lower extremity dorsiflexion intact but slightly decreased  Muscle strength with plantar flexion of left foot 4+/5  Muscle strength right lower extremity 5/5  +2 graft pulse right thigh and the   Weak graft pulse left thigh  Bilateral femoral grafts easily palpable bilateral groins  Well-healed bilateral groin, thigh and lower legs incisions  Pulse exam:  Radial: Right: 2+ Left[de-identified] 2+  Femoral: Right: 2+ Left: 2+  Popliteal: Right: non-palpable Left: non-palpable  DP: Right: 2+ Left: non-palpable  PT: Right: 1+ Left: doppler signal  Doppler signals:  Right:  Biphasic PT, peroneal   Triphasic DP, AT  Left:  Biphasic PT, peroneal   Monophasic AT    No dopplerable DP    Ivonne Marcelino PA-C  12/1/2018  [facility], [phone] fall risk

## 2025-05-14 NOTE — DISCHARGE NOTE PROVIDER - CARE PROVIDERS DIRECT ADDRESSES
[email or web address]

## 2025-05-14 NOTE — DISCHARGE NOTE PROVIDER - CARE PROVIDER_API CALL
Bret Sears  Orthopaedic Surgery  [address], [address]  [place] [address]  Phone: [phone]  Fax: [phone]  Follow Up Time:

## 2025-05-14 NOTE — DISCHARGE NOTE PROVIDER - NSDCFUSCHEDAPPT_GEN_ALL_CORE_FT
Bret Sears  [facility] Physician [facility]  ORTHOSURG [address]  Scheduled Appointment: 05/28/2025    Nazanin Diaz  [facility] Physician [facility]  INTMED [address]  Scheduled Appointment: 08/08/2025

## 2025-05-14 NOTE — DISCHARGE NOTE PROVIDER - NSDCCPTREATMENT_GEN_ALL_CORE_FT
PRINCIPAL PROCEDURE  Procedure: Left total knee arthroplasty  Findings and Treatment: Pain control:        -Acetaminophen 325mg - 3 tabs every 8 hours  As needed:        -Tramadol 50mg - 1 tab every 6 hours - Take only if needed for MODERATE pain       -Oxycodone 5mg - 1 tab every 4 hours - Take only if needed for SEVERE or BREAKTHROUGH pain  Oxycodone and Tramadol have been sent to your pharmacy. Please do not drive, operate machinery, or make important decisions while taking these medications.   Other Medications:  -Aspirin (Enteric Coated) 81mg every 12 hours - to prevent blood clots (for 6 weeks post operatively.)  -Protonix 40mg - 1 tab every 24 hours - to prevent stomach irritation/ulcers  -Senna 8.6mg - 2 pills every 24 hours - stool softener  -Miralax 17g - daily - constipation   Follow up: Please follow up at your prescheduled post-operative follow up appointment with Dr. Sears for 2 weeks after hospital discharge. Please call with any questions or concerns including fevers, worsening pain, pus from the wounds, redness of the skin and difficulty breathing or heaviness in the chest at [phone].

## 2025-05-15 ENCOUNTER — TRANSCRIPTION ENCOUNTER (OUTPATIENT)
Age: 70
End: 2025-05-15

## 2025-05-15 VITALS
TEMPERATURE: 98 F | DIASTOLIC BLOOD PRESSURE: 72 MMHG | SYSTOLIC BLOOD PRESSURE: 127 MMHG | RESPIRATION RATE: 18 BRPM | OXYGEN SATURATION: 100 % | HEART RATE: 70 BPM

## 2025-05-15 DIAGNOSIS — M17.9 OSTEOARTHRITIS OF KNEE, UNSPECIFIED: ICD-10-CM

## 2025-05-15 DIAGNOSIS — E03.9 HYPOTHYROIDISM, UNSPECIFIED: ICD-10-CM

## 2025-05-15 DIAGNOSIS — E11.9 TYPE 2 DIABETES MELLITUS WITHOUT COMPLICATIONS: ICD-10-CM

## 2025-05-15 DIAGNOSIS — I10 ESSENTIAL (PRIMARY) HYPERTENSION: ICD-10-CM

## 2025-05-15 LAB
ANION GAP SERPL CALC-SCNC: 12 MMOL/L — SIGNIFICANT CHANGE UP (ref 7–14)
BUN SERPL-MCNC: 16 MG/DL — SIGNIFICANT CHANGE UP (ref 7–23)
CALCIUM SERPL-MCNC: 9 MG/DL — SIGNIFICANT CHANGE UP (ref 8.4–10.5)
CHLORIDE SERPL-SCNC: 104 MMOL/L — SIGNIFICANT CHANGE UP (ref 98–107)
CO2 SERPL-SCNC: 22 MMOL/L — SIGNIFICANT CHANGE UP (ref 22–31)
CREAT SERPL-MCNC: 1.06 MG/DL — SIGNIFICANT CHANGE UP (ref 0.5–1.3)
EGFR: 56 ML/MIN/1.73M2 — LOW
EGFR: 56 ML/MIN/1.73M2 — LOW
GLUCOSE BLDC GLUCOMTR-MCNC: 105 MG/DL — HIGH (ref 70–99)
GLUCOSE BLDC GLUCOMTR-MCNC: 115 MG/DL — HIGH (ref 70–99)
GLUCOSE BLDC GLUCOMTR-MCNC: 117 MG/DL — HIGH (ref 70–99)
GLUCOSE BLDC GLUCOMTR-MCNC: 123 MG/DL — HIGH (ref 70–99)
GLUCOSE SERPL-MCNC: 121 MG/DL — HIGH (ref 70–99)
HCT VFR BLD CALC: 39 % — SIGNIFICANT CHANGE UP (ref 34.5–45)
HGB BLD-MCNC: 12.7 G/DL — SIGNIFICANT CHANGE UP (ref 11.5–15.5)
MCHC RBC-ENTMCNC: 29.1 PG — SIGNIFICANT CHANGE UP (ref 27–34)
MCHC RBC-ENTMCNC: 32.6 G/DL — SIGNIFICANT CHANGE UP (ref 32–36)
MCV RBC AUTO: 89.2 FL — SIGNIFICANT CHANGE UP (ref 80–100)
NRBC # BLD AUTO: 0 K/UL — SIGNIFICANT CHANGE UP (ref 0–0)
NRBC # FLD: 0 K/UL — SIGNIFICANT CHANGE UP (ref 0–0)
NRBC BLD AUTO-RTO: 0 /100 WBCS — SIGNIFICANT CHANGE UP (ref 0–0)
PLATELET # BLD AUTO: 176 K/UL — SIGNIFICANT CHANGE UP (ref 150–400)
POTASSIUM SERPL-MCNC: 4.4 MMOL/L — SIGNIFICANT CHANGE UP (ref 3.5–5.3)
POTASSIUM SERPL-SCNC: 4.4 MMOL/L — SIGNIFICANT CHANGE UP (ref 3.5–5.3)
RBC # BLD: 4.37 M/UL — SIGNIFICANT CHANGE UP (ref 3.8–5.2)
RBC # FLD: 12.9 % — SIGNIFICANT CHANGE UP (ref 10.3–14.5)
SODIUM SERPL-SCNC: 138 MMOL/L — SIGNIFICANT CHANGE UP (ref 135–145)
WBC # BLD: 8.42 K/UL — SIGNIFICANT CHANGE UP (ref 3.8–10.5)
WBC # FLD AUTO: 8.42 K/UL — SIGNIFICANT CHANGE UP (ref 3.8–10.5)

## 2025-05-15 PROCEDURE — 99223 1ST HOSP IP/OBS HIGH 75: CPT

## 2025-05-15 RX ORDER — NALOXONE HYDROCHLORIDE 0.4 MG/ML
1 INJECTION, SOLUTION INTRAMUSCULAR; INTRAVENOUS; SUBCUTANEOUS
Qty: 1 | Refills: 0
Start: 2025-05-15 | End: 2025-05-15

## 2025-05-15 RX ORDER — OXYCODONE HYDROCHLORIDE 30 MG/1
1 TABLET ORAL
Qty: 28 | Refills: 0
Start: 2025-05-15 | End: 2025-05-21

## 2025-05-15 RX ORDER — CELECOXIB 50 MG/1
1 CAPSULE ORAL
Qty: 28 | Refills: 0
Start: 2025-05-15 | End: 2025-05-28

## 2025-05-15 RX ORDER — ASPIRIN 325 MG
1 TABLET ORAL
Qty: 84 | Refills: 0
Start: 2025-05-15 | End: 2025-06-25

## 2025-05-15 RX ORDER — TRAMADOL HYDROCHLORIDE 50 MG/1
1 TABLET, FILM COATED ORAL
Qty: 28 | Refills: 0
Start: 2025-05-15 | End: 2025-05-21

## 2025-05-15 RX ORDER — SENNA 187 MG
2 TABLET ORAL
Qty: 28 | Refills: 0
Start: 2025-05-15 | End: 2025-05-28

## 2025-05-15 RX ORDER — POLYETHYLENE GLYCOL 3350 17 G/17G
17 POWDER, FOR SOLUTION ORAL
Qty: 238 | Refills: 0
Start: 2025-05-15 | End: 2025-05-28

## 2025-05-15 RX ORDER — ACETAMINOPHEN 500 MG/5ML
3 LIQUID (ML) ORAL
Qty: 0 | Refills: 0 | DISCHARGE
Start: 2025-05-15

## 2025-05-15 RX ADMIN — OXYCODONE HYDROCHLORIDE 10 MILLIGRAM(S): 30 TABLET ORAL at 09:57

## 2025-05-15 RX ADMIN — OXYCODONE HYDROCHLORIDE 10 MILLIGRAM(S): 30 TABLET ORAL at 13:02

## 2025-05-15 RX ADMIN — OXYCODONE HYDROCHLORIDE 10 MILLIGRAM(S): 30 TABLET ORAL at 08:57

## 2025-05-15 RX ADMIN — Medication 1000 MILLIGRAM(S): at 02:00

## 2025-05-15 RX ADMIN — KETOROLAC TROMETHAMINE 15 MILLIGRAM(S): 30 INJECTION, SOLUTION INTRAMUSCULAR; INTRAVENOUS at 02:00

## 2025-05-15 RX ADMIN — KETOROLAC TROMETHAMINE 15 MILLIGRAM(S): 30 INJECTION, SOLUTION INTRAMUSCULAR; INTRAVENOUS at 01:30

## 2025-05-15 RX ADMIN — Medication 81 MILLIGRAM(S): at 17:23

## 2025-05-15 RX ADMIN — KETOROLAC TROMETHAMINE 15 MILLIGRAM(S): 30 INJECTION, SOLUTION INTRAMUSCULAR; INTRAVENOUS at 08:45

## 2025-05-15 RX ADMIN — Medication 100 MILLIGRAM(S): at 10:00

## 2025-05-15 RX ADMIN — KETOROLAC TROMETHAMINE 15 MILLIGRAM(S): 30 INJECTION, SOLUTION INTRAMUSCULAR; INTRAVENOUS at 13:42

## 2025-05-15 RX ADMIN — Medication 40 MILLIGRAM(S): at 05:59

## 2025-05-15 RX ADMIN — URSODIOL 250 MILLIGRAM(S): 300 CAPSULE ORAL at 09:28

## 2025-05-15 RX ADMIN — OXYCODONE HYDROCHLORIDE 10 MILLIGRAM(S): 30 TABLET ORAL at 13:42

## 2025-05-15 RX ADMIN — OXYCODONE HYDROCHLORIDE 10 MILLIGRAM(S): 30 TABLET ORAL at 03:45

## 2025-05-15 RX ADMIN — Medication 400 MILLIGRAM(S): at 01:29

## 2025-05-15 RX ADMIN — Medication 1000 MILLIGRAM(S): at 10:00

## 2025-05-15 RX ADMIN — KETOROLAC TROMETHAMINE 15 MILLIGRAM(S): 30 INJECTION, SOLUTION INTRAMUSCULAR; INTRAVENOUS at 13:03

## 2025-05-15 RX ADMIN — Medication 500 MILLILITER(S): at 07:15

## 2025-05-15 RX ADMIN — Medication 175 MICROGRAM(S): at 05:58

## 2025-05-15 RX ADMIN — LOSARTAN POTASSIUM 100 MILLIGRAM(S): 100 TABLET, FILM COATED ORAL at 05:58

## 2025-05-15 RX ADMIN — OXYCODONE HYDROCHLORIDE 10 MILLIGRAM(S): 30 TABLET ORAL at 02:57

## 2025-05-15 RX ADMIN — Medication 100 MILLIGRAM(S): at 02:13

## 2025-05-15 RX ADMIN — Medication 1000 MILLIGRAM(S): at 18:00

## 2025-05-15 RX ADMIN — KETOROLAC TROMETHAMINE 15 MILLIGRAM(S): 30 INJECTION, SOLUTION INTRAMUSCULAR; INTRAVENOUS at 07:54

## 2025-05-15 RX ADMIN — Medication 81 MILLIGRAM(S): at 05:59

## 2025-05-15 RX ADMIN — Medication 400 MILLIGRAM(S): at 17:23

## 2025-05-15 RX ADMIN — Medication 400 MILLIGRAM(S): at 09:03

## 2025-05-15 NOTE — CONSULT NOTE ADULT - SUBJECTIVE AND OBJECTIVE BOX
[facility] Division of Hospital Medicine  Oh Gregory MD  Contact via [employer] Teams (Mon-Fri 8AM-4PM)  Otherwise: contact Hospitalist in Charge at [phone]    Patient is a 70y old  Female who presents with a chief complaint of s/p L TKA (14 May 2025 20:25)      HPI:  70F [employer], DM2, HTN, Hypothyroid, KENNY, p/w OA s/p Lt TKR on 5/14. Pain is rated 5/10; localized at surgical site, achy in nature, non-radiating, worse with movement, but improved with pain meds.   No F/C, N/V, CP, SOB, Cough, lightheadedness, dizziness, abdominal pain, diarrhea, dysuria.    Pain Symptoms if applicable:             	                         none	   mild         moderate         severe  Pain:	            5                0	    1-3	     4-6	         7-10  Location:	Surgical site  Modifying factors:	Worse with movement  Associated symptoms:	    Allergies    MSG (Hives)  No Known Drug Allergies    Intolerances        HOME MEDICATIONS: Reviewed    MEDICATIONS  (STANDING):  acetaminophen   IVPB .. 1000 milliGRAM(s) IV Intermittent once  amLODIPine   Tablet 10 milliGRAM(s) Oral at bedtime  aspirin enteric coated 81 milliGRAM(s) Oral two times a day  dextrose 5%. 1000 milliLiter(s) (50 mL/Hr) IV Continuous <Continuous>  dextrose 5%. 1000 milliLiter(s) (100 mL/Hr) IV Continuous <Continuous>  dextrose 50% Injectable 25 Gram(s) IV Push once  dextrose 50% Injectable 12.5 Gram(s) IV Push once  dextrose 50% Injectable 25 Gram(s) IV Push once  glucagon  Injectable 1 milliGRAM(s) IntraMuscular once  insulin lispro (ADMELOG) corrective regimen sliding scale   SubCutaneous three times a day before meals  insulin lispro (ADMELOG) corrective regimen sliding scale   SubCutaneous at bedtime  ketorolac   Injectable 15 milliGRAM(s) IV Push every 6 hours  levothyroxine 175 MICROGram(s) Oral daily  losartan 100 milliGRAM(s) Oral daily  pantoprazole    Tablet 40 milliGRAM(s) Oral before breakfast  polyethylene glycol 3350 17 Gram(s) Oral at bedtime  propranolol 80 milliGRAM(s) Oral <User Schedule>  propranolol 40 milliGRAM(s) Oral <User Schedule>  rosuvastatin 10 milliGRAM(s) Oral at bedtime  senna 2 Tablet(s) Oral at bedtime  ursodiol Tablet 250 milliGRAM(s) Oral two times a day    MEDICATIONS  (PRN):  dextrose Oral Gel 15 Gram(s) Oral once PRN Blood Glucose LESS THAN 70 milliGRAM(s)/deciliter  magnesium hydroxide Suspension 30 milliLiter(s) Oral daily PRN Constipation  ondansetron Injectable 4 milliGRAM(s) IV Push every 6 hours PRN Nausea and/or Vomiting  oxyCODONE    IR 5 milliGRAM(s) Oral every 4 hours PRN Moderate Pain (4 - 6)  oxyCODONE    IR 10 milliGRAM(s) Oral every 4 hours PRN Severe Pain (7 - 10)  traMADol 50 milliGRAM(s) Oral every 6 hours PRN Mild Pain (1 - 3)      PAST MEDICAL & SURGICAL HISTORY:  Osteoarthritis      Sleep apnea      DM (diabetes mellitus)      HTN (hypertension)      HLD (hyperlipidemia)      Obesity      Hypothyroid      H/O: hysterectomy      History of cholecystectomy          SOCIAL HISTORY:  No tobacco/alcohol use/abuse.    FAMILY HISTORY:  No pertinent family history in first degree relatives        REVIEW OF SYSTEMS:    CONSTITUTIONAL: No fever, weight loss, or fatigue  EYES: No eye pain, visual disturbances, or discharge  ENMT:  No difficulty hearing, tinnitus, vertigo; No sinus or throat pain  NECK: No pain or stiffness  RESPIRATORY: No cough, wheezing, chills or hemoptysis; No shortness of breath  CARDIOVASCULAR: No chest pain, palpitations, dizziness, or leg swelling  GASTROINTESTINAL: No abdominal or epigastric pain. No nausea, vomiting, or hematemesis; No diarrhea or constipation. No melena or hematochezia.  GENITOURINARY: No dysuria, frequency, hematuria, or incontinence  NEUROLOGICAL: No headaches, memory loss, loss of strength, numbness, or tremors  SKIN: No itching, burning, rashes, or lesions   LYMPH NODES: No enlarged glands  ENDOCRINE: No heat or cold intolerance; No hair loss  MUSCULOSKELETAL: left knee pain  PSYCHIATRIC: No depression, anxiety, mood swings, or difficulty sleeping  HEME/LYMPH: No easy bruising, or bleeding gums  ALLERGY AND IMMUNOLOGIC: No hives or eczema    [] Unable to obtain due to poor mental status    Vital Signs Last 24 Hrs  T(C): 36.5 (15 May 2025 09:16), Max: 36.7 (14 May 2025 14:42)  T(F): 97.7 (15 May 2025 09:16), Max: 98.1 (15 May 2025 05:55)  HR: 64 (15 May 2025 09:16) (46 - 82)  BP: 151/57 (15 May 2025 09:16) (92/79 - 159/66)  BP(mean): 102 (14 May 2025 20:00) (85 - 102)  RR: 17 (15 May 2025 09:16) (12 - 23)  SpO2: 98% (15 May 2025 09:16) (94% - 100%)    Parameters below as of 15 May 2025 09:16  Patient On (Oxygen Delivery Method): room air      CAPILLARY BLOOD GLUCOSE      POCT Blood Glucose.: 115 mg/dL (15 May 2025 07:18)  POCT Blood Glucose.: 127 mg/dL (14 May 2025 22:03)  POCT Blood Glucose.: 84 mg/dL (14 May 2025 20:16)  POCT Blood Glucose.: 82 mg/dL (14 May 2025 18:22)  POCT Blood Glucose.: 105 mg/dL (14 May 2025 15:00)      PHYSICAL EXAM:    CONSTITUTIONAL: NAD, well-developed, well-groomed  EYES: PERRLA; conjunctiva and sclera clear  ENMT: Moist oral mucosa, no pharyngeal injection or exudates; normal dentition  NECK: Supple, no palpable masses; no thyromegaly  RESPIRATORY: Normal respiratory effort; lungs are clear to auscultation bilaterally  CARDIOVASCULAR: Regular rate and rhythm, normal S1 and S2, no murmur/rub/gallop; No lower extremity edema; Peripheral pulses are 2+ bilaterally  ABDOMEN: Nontender to palpation, normoactive bowel sounds, no rebound/guarding; No hepatosplenomegaly  MUSCULOSKELETAL:  Did not assess gait; no clubbing or cyanosis of digits; no joint swelling or tenderness to palpation, left knee limited ROM d/t pain  PSYCH: A+O to person, place, and time; affect appropriate  NEUROLOGY: CN 2-12 are intact and symmetric; no gross sensory deficits   SKIN: No rashes; no palpable lesions, surgical dressing C/D/I    LABS:                        12.7   8.42  )-----------( 176      ( 15 May 2025 06:22 )             39.0     05-15    138  |  104  |  16  ----------------------------<  121[H]  4.4   |  22  |  1.06    Ca    9.0      15 May 2025 06:22        Urinalysis Basic - ( 15 May 2025 06:22 )    Color: x / Appearance: x / SG: x / pH: x  Gluc: 121 mg/dL / Ketone: x  / Bili: x / Urobili: x   Blood: x / Protein: x / Nitrite: x   Leuk Esterase: x / RBC: x / WBC x   Sq Epi: x / Non Sq Epi: x / Bacteria: x      CAPILLARY BLOOD GLUCOSE      POCT Blood Glucose.: 115 mg/dL (15 May 2025 07:18)      RADIOLOGY & ADDITIONAL STUDIES:    Imaging:   Personally Reviewed:  [ ] YES               EKG:   Personally Reviewed:  [ ] YES       Care Discussed with Consultant(s)/Other Providers:  Care Discussed with Primary Team.      [ ] Increased delirium risk  [ ] Delirium and other risks can be reduced by:          -early ambulation          -minimizing "tethers" - IV, oxygen, catheters, etc          -avoiding hypnotics and sedatives          -maintaining hydration/nutrition          -avoid anticholinergics - diphenhydramine, etc          -pain control          -supportive environment

## 2025-05-15 NOTE — PATIENT PROFILE ADULT - FALL HARM RISK - HARM RISK INTERVENTIONS
Assistance with ambulation/Assistance OOB with selected safe patient handling equipment/Communicate Risk of Fall with Harm to all staff/Discuss with provider need for PT consult/Monitor gait and stability/Provide patient with walking aids - walker, cane, crutches/Reinforce activity limits and safety measures with patient and family/Sit up slowly, dangle for a short time, stand at bedside before walking/Tailored Fall Risk Interventions/Use of alarms - bed, chair and/or voice tab/Visual Cue: Yellow wristband and red socks/Bed in lowest position, wheels locked, appropriate side rails in place/Call bell, personal items and telephone in reach/Instruct patient to call for assistance before getting out of bed or chair/Non-slip footwear when patient is out of bed/[place] to call system/Physically safe environment - no spills, clutter or unnecessary equipment/Purposeful Proactive Rounding/Room/bathroom lighting operational, light cord in reach

## 2025-05-15 NOTE — DISCHARGE NOTE NURSING/CASE MANAGEMENT/SOCIAL WORK - NSDCPNINST_GEN_ALL_CORE
Call MD for follow up appointment. Notify MD for any signs/symptoms of infection, i.e. fever over 101, redness swelling or drainage, nausea/vomiting, pain not relieved by pain medication. Drink plenty of fluids.  You have a post op appointment on 5/28/25 @12noon w/ dr Sears. Maintain Knee incision and dressing clean dry and intact. Call MD with any signs of infection ie fever, redness, drainage, or with signs of bleeding, unrelieved increased pain, or persistent nausea. Continue to drink plenty of fluids. Avoid strenuous activity and constipation which may be a side effect from taking certain medications and narcotics.  Total Knee replacement precautions reviewed with patient. Safety and fall prevention measures reinforced.

## 2025-05-15 NOTE — DISCHARGE NOTE NURSING/CASE MANAGEMENT/SOCIAL WORK - PATIENT PORTAL LINK FT
You can access the FollowMyHealth Patient Portal offered by [facility] by registering at the following website: [email or web address]. By joining [employer]’s FollowMyHealth portal, you will also be able to view your health information using other applications (apps) compatible with our system.

## 2025-05-15 NOTE — PHYSICAL THERAPY INITIAL EVALUATION ADULT - ACTIVE RANGE OF MOTION EXAMINATION, REHAB EVAL
left hip and knee flexion 0-90 degrees, ankle WFL/bilateral upper extremity Active ROM was WFL (within functional limits)/Right LE Active ROM was WFL (within functional limits)

## 2025-05-15 NOTE — CHART NOTE - NSCHARTNOTEFT_GEN_A_CORE
ORTHO CHART NOTE    Patient requires a 3 in 1 commode.  Patient is confined to a single floor in a home and there is no bathroom on that floor.

## 2025-05-15 NOTE — DISCHARGE NOTE NURSING/CASE MANAGEMENT/SOCIAL WORK - FINANCIAL ASSISTANCE
[facility] provides services at a reduced cost to those who are determined to be eligible through [facility]’s financial assistance program. Information regarding [facility]’s financial assistance program can be found by going to [email or web address] or by calling [phone].

## 2025-05-15 NOTE — PHYSICAL THERAPY INITIAL EVALUATION ADULT - PREDICTED DURATION OF THERAPY (DAYS/WKS), PT EVAL
Refill Approved    Rx renewed per Medication Renewal Policy. Medication was last renewed on 1/16/20.    Florence Nolan, [facility] Triage/Med Refill 2/4/2020     Requested Prescriptions   Pending Prescriptions Disp Refills     gabapentin (NEURONTIN) 300 MG capsule [Pharmacy Med Name: GABAPENTIN 300MG CAPSULES] 30 capsule 0     Sig: TAKE 1 CAPSULE BY MOUTH AT BEDTIME       Gabapentin/Levetiracetam/Tiagabine Refill Protocol  Passed - 2/4/2020  4:41 PM        Passed - PCP or prescribing provider visit in past 12 months or next 3 months     Last office visit with prescriber/PCP: 1/16/2020 Elsa Scott MD OR same dept: 1/16/2020 Elsa Scott MD OR same specialty: 1/16/2020 Elsa Scott MD  Last physical: Visit date not found Last MTM visit: Visit date not found   Next visit within 3 mo: Visit date not found  Next physical within 3 mo: Visit date not found  Prescriber OR PCP: Elsa Scott MD  Last diagnosis associated with med order: 1. Hip pain, left  - gabapentin (NEURONTIN) 300 MG capsule [Pharmacy Med Name: GABAPENTIN 300MG CAPSULES]; TAKE 1 CAPSULE BY MOUTH AT BEDTIME  Dispense: 30 capsule; Refill: 0    If protocol passes may refill for 12 months if within 3 months of last provider visit (or a total of 15 months).                         
by discharge

## 2025-05-15 NOTE — OCCUPATIONAL THERAPY INITIAL EVALUATION ADULT - ADDITIONAL COMMENTS
Following evaluation, patient left sitting in bedside chir. All lines intact and call bell in reach.

## 2025-05-15 NOTE — OCCUPATIONAL THERAPY INITIAL EVALUATION ADULT - NSOTDISCHREC_GEN_A_CORE
Patient reports her daughter is able to assist with IADLs as needed at home. Patient demonstrates understanding of basic ADL and functional transfer skills. Patient is functionally cleared for discharge from OT perspective. OT to continue to follow while inpatient./No skilled OT needs

## 2025-05-15 NOTE — PROGRESS NOTE ADULT - SUBJECTIVE AND OBJECTIVE BOX
ORTHO PROGRESS NOTE     Pt seen and examined at bedside, denies SOB, CP, Dizziness. N/V/D /HA.  No significant overnight events. Pain well controlled.    Vital Signs Last 24 Hrs  T(C): 36.7 (15 May 2025 05:55), Max: 36.7 (14 May 2025 14:42)  T(F): 98.1 (15 May 2025 05:55), Max: 98.1 (15 May 2025 05:55)  HR: 61 (15 May 2025 05:55) (46 - 82)  BP: 116/49 (15 May 2025 05:55) (92/79 - 159/66)  BP(mean): 102 (14 May 2025 20:00) (85 - 102)  RR: 16 (15 May 2025 05:55) (12 - 23)  SpO2: 99% (15 May 2025 05:55) (94% - 100%)    Parameters below as of 15 May 2025 05:55  Patient On (Oxygen Delivery Method): room air        Gen: NAD, alert and oriented  Resp: Unlabored breathing  LLE: Dressing c/d/i       SILT DP/SP/ Max/Saph/tib       5/5 EHL 5/5 FHL 5/5 TA 5/5 Gastroc 5/5 IP        DP+,        soft compartments, no calf ttp,       Labs:              A/P  Pt is a 70y Female s/p L TKA    - Pain control/ Analgesia  - DVT ppx A81 BID  -PT/OT   -WBAT  - Pending PT eval
Orthopedics Post-Op Check:  Patient was seen and examined at bedside. Denies CP/SOB/Dizziness/N/V/D/HA. Pain is well controlled at the moment.    Vital Signs Last 24 Hrs  T(C): 36.3 (14 May 2025 19:00), Max: 36.7 (14 May 2025 14:42)  T(F): 97.3 (14 May 2025 19:00), Max: 98 (14 May 2025 14:42)  HR: 55 (14 May 2025 19:45) (46 - 57)  BP: 151/79 (14 May 2025 19:45) (92/79 - 154/72)  BP(mean): 99 (14 May 2025 19:45) (85 - 99)  RR: 17 (14 May 2025 19:45) (12 - 23)  SpO2: 97% (14 May 2025 19:45) (94% - 100%)    Parameters below as of 14 May 2025 19:00  Patient On (Oxygen Delivery Method): room air          Physical Exam:  Gen: NAD  L LE:   Dressing C/D/I  Motor intact + EHL/FHL/TA/GS. Sensation is grossly intact.   Compartments are soft, extremities are warm, DP 2+

## 2025-05-15 NOTE — OCCUPATIONAL THERAPY INITIAL EVALUATION ADULT - GENERAL OBSERVATIONS, REHAB EVAL
Patient received semisupine in bed in NAD; agreeable to participate in OT evaluation. +left LE elevated on wedge pillow. /65 mmHg.

## 2025-05-15 NOTE — CONSULT NOTE ADULT - PROBLEM SELECTOR RECOMMENDATION 9
- s/p Lt TKR on 5/14  - Pain control with oxyIR PRN, ultram PRN, Toradol IV PRN  - Bowel regiment - Senna/Miralax  - Surgical site management as per ortho  - PT/OT eval for safe dispo  - VTE ppx - ASA BID  - patient is [place] - cannot get PRBC transfusion if there's post-op anemia.

## 2025-05-23 ENCOUNTER — NON-APPOINTMENT (OUTPATIENT)
Age: 70
End: 2025-05-23

## 2025-05-27 DIAGNOSIS — Z96.652 PRESENCE OF LEFT ARTIFICIAL KNEE JOINT: ICD-10-CM

## 2025-05-28 ENCOUNTER — APPOINTMENT (OUTPATIENT)
Dept: ORTHOPEDIC SURGERY | Facility: CLINIC | Age: 70
End: 2025-05-28
Payer: MEDICARE

## 2025-05-28 VITALS — BODY MASS INDEX: 40.48 KG/M2 | WEIGHT: 243 LBS | HEIGHT: 65 IN

## 2025-05-28 DIAGNOSIS — Z96.642 PRESENCE OF LEFT ARTIFICIAL HIP JOINT: ICD-10-CM

## 2025-05-28 PROCEDURE — 73562 X-RAY EXAM OF KNEE 3: CPT | Mod: LT

## 2025-05-28 PROCEDURE — 99024 POSTOP FOLLOW-UP VISIT: CPT

## 2025-05-29 PROBLEM — Z96.642 STATUS POST TOTAL REPLACEMENT OF LEFT HIP: Status: ACTIVE | Noted: 2025-05-27

## 2025-06-17 PROBLEM — N60.99 ATYPICAL DUCTAL HYPERPLASIA, BREAST: Status: ACTIVE | Noted: 2025-06-17

## 2025-06-17 PROBLEM — D05.10 DCIS (DUCTAL CARCINOMA IN SITU): Status: ACTIVE | Noted: 2025-06-11

## 2025-06-23 ENCOUNTER — APPOINTMENT (OUTPATIENT)
Dept: SURGICAL ONCOLOGY | Facility: CLINIC | Age: 70
End: 2025-06-23
Payer: MEDICARE

## 2025-06-23 VITALS
HEIGHT: 65.5 IN | WEIGHT: 240 LBS | DIASTOLIC BLOOD PRESSURE: 70 MMHG | OXYGEN SATURATION: 99 % | BODY MASS INDEX: 39.51 KG/M2 | SYSTOLIC BLOOD PRESSURE: 130 MMHG | HEART RATE: 64 BPM

## 2025-06-23 PROBLEM — Z80.3 FAMILY HISTORY OF MALIGNANT NEOPLASM OF FEMALE BREAST: Status: ACTIVE | Noted: 2025-06-23

## 2025-06-23 PROCEDURE — 99205 OFFICE O/P NEW HI 60 MIN: CPT

## 2025-06-25 ENCOUNTER — NON-APPOINTMENT (OUTPATIENT)
Age: 70
End: 2025-06-25

## 2025-07-02 ENCOUNTER — APPOINTMENT (OUTPATIENT)
Dept: MRI IMAGING | Facility: IMAGING CENTER | Age: 70
End: 2025-07-02
Payer: MEDICARE

## 2025-07-02 ENCOUNTER — OUTPATIENT (OUTPATIENT)
Dept: OUTPATIENT SERVICES | Facility: HOSPITAL | Age: 70
LOS: 1 days | End: 2025-07-02
Payer: MEDICARE

## 2025-07-02 DIAGNOSIS — Z90.710 ACQUIRED ABSENCE OF BOTH CERVIX AND UTERUS: Chronic | ICD-10-CM

## 2025-07-02 DIAGNOSIS — D05.10 INTRADUCTAL CARCINOMA IN SITU OF UNSPECIFIED BREAST: ICD-10-CM

## 2025-07-02 PROCEDURE — A9585: CPT

## 2025-07-02 PROCEDURE — 77049 MRI BREAST C-+ W/CAD BI: CPT | Mod: 26

## 2025-07-02 PROCEDURE — C8908: CPT

## 2025-07-02 PROCEDURE — C8937: CPT

## 2025-07-07 PROBLEM — R92.8 ABNORMAL MRI, BREAST: Status: ACTIVE | Noted: 2025-07-07

## 2025-07-08 ENCOUNTER — APPOINTMENT (OUTPATIENT)
Dept: INTERNAL MEDICINE | Facility: CLINIC | Age: 70
End: 2025-07-08
Payer: MEDICARE

## 2025-07-08 VITALS
HEART RATE: 78 BPM | BODY MASS INDEX: 38.35 KG/M2 | TEMPERATURE: 98.1 F | OXYGEN SATURATION: 98 % | WEIGHT: 234 LBS | SYSTOLIC BLOOD PRESSURE: 126 MMHG | DIASTOLIC BLOOD PRESSURE: 69 MMHG

## 2025-07-08 PROCEDURE — 93000 ELECTROCARDIOGRAM COMPLETE: CPT

## 2025-07-08 PROCEDURE — 99214 OFFICE O/P EST MOD 30 MIN: CPT

## 2025-07-08 PROCEDURE — G2211 COMPLEX E/M VISIT ADD ON: CPT

## 2025-07-11 ENCOUNTER — OUTPATIENT (OUTPATIENT)
Dept: OUTPATIENT SERVICES | Facility: HOSPITAL | Age: 70
LOS: 1 days | End: 2025-07-11
Payer: MEDICARE

## 2025-07-11 ENCOUNTER — RESULT REVIEW (OUTPATIENT)
Age: 70
End: 2025-07-11

## 2025-07-11 DIAGNOSIS — Z90.710 ACQUIRED ABSENCE OF BOTH CERVIX AND UTERUS: Chronic | ICD-10-CM

## 2025-07-11 DIAGNOSIS — C80.1 MALIGNANT (PRIMARY) NEOPLASM, UNSPECIFIED: ICD-10-CM

## 2025-07-11 PROCEDURE — 88360 TUMOR IMMUNOHISTOCHEM/MANUAL: CPT | Mod: 26,59

## 2025-07-11 PROCEDURE — 88321 CONSLTJ&REPRT SLD PREP ELSWR: CPT

## 2025-07-16 ENCOUNTER — RX RENEWAL (OUTPATIENT)
Age: 70
End: 2025-07-16

## 2025-07-16 NOTE — ASU PREOP CHECKLIST - ORDERS/MEDICATION ADMINISTRATION RECORD ON CHART
Detail Level: Generalized Sunscreen Recommendations: Recommend mineral sunscreens, wide brimmed hat, protective clothing and avoiding direct sunlight during peak hours of the day. Detail Level: Detailed done

## 2025-07-23 ENCOUNTER — APPOINTMENT (OUTPATIENT)
Dept: MAMMOGRAPHY | Facility: IMAGING CENTER | Age: 70
End: 2025-07-23
Payer: MEDICARE

## 2025-07-23 ENCOUNTER — RESULT REVIEW (OUTPATIENT)
Age: 70
End: 2025-07-23

## 2025-07-23 ENCOUNTER — APPOINTMENT (OUTPATIENT)
Dept: MRI IMAGING | Facility: IMAGING CENTER | Age: 70
End: 2025-07-23
Payer: MEDICARE

## 2025-07-23 ENCOUNTER — OUTPATIENT (OUTPATIENT)
Dept: OUTPATIENT SERVICES | Facility: HOSPITAL | Age: 70
LOS: 1 days | End: 2025-07-23
Payer: MEDICARE

## 2025-07-23 DIAGNOSIS — Z00.8 ENCOUNTER FOR OTHER GENERAL EXAMINATION: ICD-10-CM

## 2025-07-23 PROCEDURE — 77065 DX MAMMO INCL CAD UNI: CPT | Mod: 26,RT

## 2025-07-23 PROCEDURE — A9585: CPT

## 2025-07-23 PROCEDURE — A4648: CPT

## 2025-07-23 PROCEDURE — 19085 BX BREAST 1ST LESION MR IMAG: CPT

## 2025-07-23 PROCEDURE — 19086 BX BREAST ADD LESION MR IMAG: CPT

## 2025-07-23 PROCEDURE — 76098 X-RAY EXAM SURGICAL SPECIMEN: CPT | Mod: 26

## 2025-07-23 PROCEDURE — 88305 TISSUE EXAM BY PATHOLOGIST: CPT | Mod: 26

## 2025-07-23 PROCEDURE — 19081 BX BREAST 1ST LESION STRTCTC: CPT | Mod: RT

## 2025-07-23 PROCEDURE — 77065 DX MAMMO INCL CAD UNI: CPT

## 2025-07-23 PROCEDURE — 19086 BX BREAST ADD LESION MR IMAG: CPT | Mod: RT

## 2025-07-23 PROCEDURE — 19085 BX BREAST 1ST LESION MR IMAG: CPT | Mod: 53,RT

## 2025-07-23 PROCEDURE — 19081 BX BREAST 1ST LESION STRTCTC: CPT

## 2025-07-23 PROCEDURE — 88305 TISSUE EXAM BY PATHOLOGIST: CPT

## 2025-07-28 LAB — SURGICAL PATHOLOGY STUDY: SIGNIFICANT CHANGE UP

## 2025-08-04 ENCOUNTER — OUTPATIENT (OUTPATIENT)
Dept: OUTPATIENT SERVICES | Facility: HOSPITAL | Age: 70
LOS: 1 days | End: 2025-08-04

## 2025-08-04 VITALS
SYSTOLIC BLOOD PRESSURE: 130 MMHG | DIASTOLIC BLOOD PRESSURE: 58 MMHG | HEIGHT: 66 IN | HEART RATE: 58 BPM | RESPIRATION RATE: 15 BRPM | TEMPERATURE: 98 F | OXYGEN SATURATION: 99 % | WEIGHT: 231.04 LBS

## 2025-08-04 DIAGNOSIS — Z90.710 ACQUIRED ABSENCE OF BOTH CERVIX AND UTERUS: Chronic | ICD-10-CM

## 2025-08-04 DIAGNOSIS — Z53.1 PROCEDURE AND TREATMENT NOT CARRIED OUT BECAUSE OF PATIENT'S DECISION FOR REASONS OF BELIEF AND GROUP PRESSURE: ICD-10-CM

## 2025-08-04 DIAGNOSIS — Z96.652 PRESENCE OF LEFT ARTIFICIAL KNEE JOINT: Chronic | ICD-10-CM

## 2025-08-04 DIAGNOSIS — D05.11 INTRADUCTAL CARCINOMA IN SITU OF RIGHT BREAST: ICD-10-CM

## 2025-08-04 DIAGNOSIS — E03.9 HYPOTHYROIDISM, UNSPECIFIED: ICD-10-CM

## 2025-08-04 DIAGNOSIS — D05.10 INTRADUCTAL CARCINOMA IN SITU OF UNSPECIFIED BREAST: ICD-10-CM

## 2025-08-04 DIAGNOSIS — I10 ESSENTIAL (PRIMARY) HYPERTENSION: ICD-10-CM

## 2025-08-04 DIAGNOSIS — G47.33 OBSTRUCTIVE SLEEP APNEA (ADULT) (PEDIATRIC): ICD-10-CM

## 2025-08-04 DIAGNOSIS — Z98.890 OTHER SPECIFIED POSTPROCEDURAL STATES: Chronic | ICD-10-CM

## 2025-08-04 DIAGNOSIS — K76.0 FATTY (CHANGE OF) LIVER, NOT ELSEWHERE CLASSIFIED: ICD-10-CM

## 2025-08-04 DIAGNOSIS — E11.9 TYPE 2 DIABETES MELLITUS WITHOUT COMPLICATIONS: ICD-10-CM

## 2025-08-04 LAB
A1C WITH ESTIMATED AVERAGE GLUCOSE RESULT: 5.6 % — SIGNIFICANT CHANGE UP (ref 4–5.6)
ESTIMATED AVERAGE GLUCOSE: 114 — SIGNIFICANT CHANGE UP

## 2025-08-05 ENCOUNTER — RESULT REVIEW (OUTPATIENT)
Age: 70
End: 2025-08-05

## 2025-08-05 ENCOUNTER — APPOINTMENT (OUTPATIENT)
Dept: MAMMOGRAPHY | Facility: IMAGING CENTER | Age: 70
End: 2025-08-05
Payer: MEDICARE

## 2025-08-05 ENCOUNTER — OUTPATIENT (OUTPATIENT)
Dept: OUTPATIENT SERVICES | Facility: HOSPITAL | Age: 70
LOS: 1 days | End: 2025-08-05
Payer: MEDICARE

## 2025-08-05 DIAGNOSIS — Z90.710 ACQUIRED ABSENCE OF BOTH CERVIX AND UTERUS: Chronic | ICD-10-CM

## 2025-08-05 DIAGNOSIS — Z96.652 PRESENCE OF LEFT ARTIFICIAL KNEE JOINT: Chronic | ICD-10-CM

## 2025-08-05 DIAGNOSIS — N60.99 UNSPECIFIED BENIGN MAMMARY DYSPLASIA OF UNSPECIFIED BREAST: ICD-10-CM

## 2025-08-05 DIAGNOSIS — Z98.890 OTHER SPECIFIED POSTPROCEDURAL STATES: Chronic | ICD-10-CM

## 2025-08-05 PROCEDURE — A4648: CPT

## 2025-08-05 PROCEDURE — 19281 PERQ DEVICE BREAST 1ST IMAG: CPT

## 2025-08-05 PROCEDURE — 19281 PERQ DEVICE BREAST 1ST IMAG: CPT | Mod: RT

## 2025-08-07 PROBLEM — K76.0 FATTY (CHANGE OF) LIVER, NOT ELSEWHERE CLASSIFIED: Chronic | Status: ACTIVE | Noted: 2025-08-04

## 2025-08-08 ENCOUNTER — RESULT REVIEW (OUTPATIENT)
Age: 70
End: 2025-08-08

## 2025-08-08 ENCOUNTER — APPOINTMENT (OUTPATIENT)
Dept: MAMMOGRAPHY | Facility: IMAGING CENTER | Age: 70
End: 2025-08-08
Payer: MEDICARE

## 2025-08-08 ENCOUNTER — APPOINTMENT (OUTPATIENT)
Dept: INTERNAL MEDICINE | Facility: CLINIC | Age: 70
End: 2025-08-08

## 2025-08-08 ENCOUNTER — OUTPATIENT (OUTPATIENT)
Dept: OUTPATIENT SERVICES | Facility: HOSPITAL | Age: 70
LOS: 1 days | End: 2025-08-08
Payer: MEDICARE

## 2025-08-08 DIAGNOSIS — Z96.652 PRESENCE OF LEFT ARTIFICIAL KNEE JOINT: Chronic | ICD-10-CM

## 2025-08-08 DIAGNOSIS — Z90.710 ACQUIRED ABSENCE OF BOTH CERVIX AND UTERUS: Chronic | ICD-10-CM

## 2025-08-08 DIAGNOSIS — N60.99 UNSPECIFIED BENIGN MAMMARY DYSPLASIA OF UNSPECIFIED BREAST: ICD-10-CM

## 2025-08-08 DIAGNOSIS — Z98.890 OTHER SPECIFIED POSTPROCEDURAL STATES: Chronic | ICD-10-CM

## 2025-08-08 PROCEDURE — 19281 PERQ DEVICE BREAST 1ST IMAG: CPT

## 2025-08-08 PROCEDURE — 19281 PERQ DEVICE BREAST 1ST IMAG: CPT | Mod: RT

## 2025-08-08 PROCEDURE — A4648: CPT

## 2025-08-11 ENCOUNTER — TRANSCRIPTION ENCOUNTER (OUTPATIENT)
Age: 70
End: 2025-08-11

## 2025-08-11 ENCOUNTER — APPOINTMENT (OUTPATIENT)
Dept: SURGICAL ONCOLOGY | Facility: AMBULATORY SURGERY CENTER | Age: 70
End: 2025-08-11

## 2025-08-11 ENCOUNTER — RESULT REVIEW (OUTPATIENT)
Age: 70
End: 2025-08-11

## 2025-08-11 ENCOUNTER — OUTPATIENT (OUTPATIENT)
Dept: OUTPATIENT SERVICES | Facility: HOSPITAL | Age: 70
LOS: 1 days | End: 2025-08-11
Payer: MEDICARE

## 2025-08-11 ENCOUNTER — OUTPATIENT (OUTPATIENT)
Dept: OUTPATIENT SERVICES | Facility: HOSPITAL | Age: 70
LOS: 1 days | End: 2025-08-11
Payer: COMMERCIAL

## 2025-08-11 ENCOUNTER — APPOINTMENT (OUTPATIENT)
Dept: MAMMOGRAPHY | Facility: IMAGING CENTER | Age: 70
End: 2025-08-11
Payer: MEDICARE

## 2025-08-11 VITALS
HEART RATE: 57 BPM | OXYGEN SATURATION: 98 % | TEMPERATURE: 98 F | SYSTOLIC BLOOD PRESSURE: 131 MMHG | DIASTOLIC BLOOD PRESSURE: 68 MMHG | RESPIRATION RATE: 16 BRPM

## 2025-08-11 VITALS
DIASTOLIC BLOOD PRESSURE: 74 MMHG | HEART RATE: 51 BPM | OXYGEN SATURATION: 100 % | RESPIRATION RATE: 17 BRPM | HEIGHT: 66 IN | WEIGHT: 231.04 LBS | TEMPERATURE: 97 F | SYSTOLIC BLOOD PRESSURE: 156 MMHG

## 2025-08-11 DIAGNOSIS — Z98.890 OTHER SPECIFIED POSTPROCEDURAL STATES: Chronic | ICD-10-CM

## 2025-08-11 DIAGNOSIS — D05.10 INTRADUCTAL CARCINOMA IN SITU OF UNSPECIFIED BREAST: ICD-10-CM

## 2025-08-11 DIAGNOSIS — Z90.710 ACQUIRED ABSENCE OF BOTH CERVIX AND UTERUS: Chronic | ICD-10-CM

## 2025-08-11 DIAGNOSIS — Z96.652 PRESENCE OF LEFT ARTIFICIAL KNEE JOINT: Chronic | ICD-10-CM

## 2025-08-11 DIAGNOSIS — Z00.8 ENCOUNTER FOR OTHER GENERAL EXAMINATION: ICD-10-CM

## 2025-08-11 LAB — GLUCOSE BLDC GLUCOMTR-MCNC: 104 MG/DL — HIGH (ref 70–99)

## 2025-08-11 PROCEDURE — 76098 X-RAY EXAM SURGICAL SPECIMEN: CPT

## 2025-08-11 PROCEDURE — 19301 PARTIAL MASTECTOMY: CPT | Mod: RT

## 2025-08-11 PROCEDURE — 88307 TISSUE EXAM BY PATHOLOGIST: CPT | Mod: 26

## 2025-08-11 PROCEDURE — 19125 EXCISION BREAST LESION: CPT | Mod: 59,RT

## 2025-08-11 PROCEDURE — 76098 X-RAY EXAM SURGICAL SPECIMEN: CPT | Mod: 26,76

## 2025-08-11 RX ORDER — LOSARTAN POTASSIUM 100 MG/1
1 TABLET, FILM COATED ORAL
Refills: 0 | DISCHARGE

## 2025-08-11 RX ORDER — LEVOTHYROXINE SODIUM 300 MCG
1 TABLET ORAL
Refills: 0 | DISCHARGE

## 2025-08-11 RX ORDER — ROSUVASTATIN CALCIUM 20 MG/1
1 TABLET, FILM COATED ORAL
Refills: 0 | DISCHARGE

## 2025-08-11 RX ORDER — URSODIOL 300 MG/1
1 CAPSULE ORAL
Refills: 0 | DISCHARGE

## 2025-08-11 RX ORDER — TIRZEPATIDE 7.5 MG/.5ML
2.5 INJECTION, SOLUTION SUBCUTANEOUS
Refills: 0 | DISCHARGE

## 2025-08-11 RX ORDER — AMLODIPINE BESYLATE 10 MG/1
1 TABLET ORAL
Refills: 0 | DISCHARGE

## 2025-08-11 RX ORDER — PROPRANOLOL HCL 60 MG
1 TABLET ORAL
Refills: 0 | DISCHARGE

## 2025-08-12 ENCOUNTER — NON-APPOINTMENT (OUTPATIENT)
Age: 70
End: 2025-08-12

## 2025-08-15 LAB — SURGICAL PATHOLOGY STUDY: SIGNIFICANT CHANGE UP

## 2025-08-21 ENCOUNTER — APPOINTMENT (OUTPATIENT)
Dept: INTERNAL MEDICINE | Facility: CLINIC | Age: 70
End: 2025-08-21
Payer: MEDICARE

## 2025-08-21 VITALS
SYSTOLIC BLOOD PRESSURE: 127 MMHG | HEIGHT: 65.5 IN | DIASTOLIC BLOOD PRESSURE: 75 MMHG | WEIGHT: 226 LBS | BODY MASS INDEX: 37.2 KG/M2 | HEART RATE: 73 BPM | OXYGEN SATURATION: 97 % | TEMPERATURE: 97.3 F

## 2025-08-21 DIAGNOSIS — E78.5 HYPERLIPIDEMIA, UNSPECIFIED: ICD-10-CM

## 2025-08-21 DIAGNOSIS — E03.9 HYPOTHYROIDISM, UNSPECIFIED: ICD-10-CM

## 2025-08-21 DIAGNOSIS — Z00.00 ENCOUNTER FOR GENERAL ADULT MEDICAL EXAMINATION W/OUT ABNORMAL FINDINGS: ICD-10-CM

## 2025-08-21 PROCEDURE — G2211 COMPLEX E/M VISIT ADD ON: CPT

## 2025-08-21 PROCEDURE — 99214 OFFICE O/P EST MOD 30 MIN: CPT

## 2025-08-21 PROCEDURE — 36415 COLL VENOUS BLD VENIPUNCTURE: CPT

## 2025-08-22 LAB
ALBUMIN SERPL ELPH-MCNC: 4.6 G/DL
ALBUMIN, RANDOM URINE: 2.1 MG/DL
ALP BLD-CCNC: 111 U/L
ALT SERPL-CCNC: 24 U/L
ANION GAP SERPL CALC-SCNC: 14 MMOL/L
AST SERPL-CCNC: 31 U/L
BILIRUB SERPL-MCNC: 0.5 MG/DL
BUN SERPL-MCNC: 14 MG/DL
CALCIUM SERPL-MCNC: 9.9 MG/DL
CHLORIDE SERPL-SCNC: 106 MMOL/L
CHOLEST SERPL-MCNC: 112 MG/DL
CO2 SERPL-SCNC: 23 MMOL/L
CREAT SERPL-MCNC: 0.84 MG/DL
CREAT SPEC-SCNC: 297 MG/DL
EGFRCR SERPLBLD CKD-EPI 2021: 74 ML/MIN/1.73M2
ESTIMATED AVERAGE GLUCOSE: 120 MG/DL
GLUCOSE SERPL-MCNC: 94 MG/DL
HBA1C MFR BLD HPLC: 5.8 %
HDLC SERPL-MCNC: 47 MG/DL
LDLC SERPL-MCNC: 47 MG/DL
MICROALBUMIN/CREAT 24H UR-RTO: 7 MG/G
NONHDLC SERPL-MCNC: 66 MG/DL
POTASSIUM SERPL-SCNC: 4.3 MMOL/L
PROT SERPL-MCNC: 7.2 G/DL
SODIUM SERPL-SCNC: 144 MMOL/L
TRIGL SERPL-MCNC: 100 MG/DL
TSH SERPL-ACNC: 0.34 UIU/ML

## 2025-08-27 ENCOUNTER — NON-APPOINTMENT (OUTPATIENT)
Age: 70
End: 2025-08-27

## 2025-08-27 ENCOUNTER — APPOINTMENT (OUTPATIENT)
Dept: SURGICAL ONCOLOGY | Facility: CLINIC | Age: 70
End: 2025-08-27
Payer: MEDICARE

## 2025-08-27 VITALS
WEIGHT: 220 LBS | TEMPERATURE: 97.3 F | BODY MASS INDEX: 36.21 KG/M2 | SYSTOLIC BLOOD PRESSURE: 138 MMHG | DIASTOLIC BLOOD PRESSURE: 74 MMHG | RESPIRATION RATE: 17 BRPM | HEART RATE: 63 BPM | OXYGEN SATURATION: 99 % | HEIGHT: 65.5 IN

## 2025-08-27 DIAGNOSIS — D05.10 INTRADUCTAL CARCINOMA IN SITU OF UNSPECIFIED BREAST: ICD-10-CM

## 2025-08-27 DIAGNOSIS — N60.99 UNSPECIFIED BENIGN MAMMARY DYSPLASIA OF UNSPECIFIED BREAST: ICD-10-CM

## 2025-08-27 PROCEDURE — 99024 POSTOP FOLLOW-UP VISIT: CPT

## 2025-08-28 ENCOUNTER — NON-APPOINTMENT (OUTPATIENT)
Age: 70
End: 2025-08-28

## 2025-08-29 ENCOUNTER — APPOINTMENT (OUTPATIENT)
Dept: ORTHOPEDIC SURGERY | Facility: CLINIC | Age: 70
End: 2025-08-29
Payer: MEDICARE

## 2025-08-29 VITALS — WEIGHT: 234 LBS | HEIGHT: 65.5 IN | BODY MASS INDEX: 38.52 KG/M2

## 2025-08-29 DIAGNOSIS — Z96.652 PRESENCE OF LEFT ARTIFICIAL KNEE JOINT: ICD-10-CM

## 2025-08-29 PROCEDURE — 99213 OFFICE O/P EST LOW 20 MIN: CPT

## 2025-08-29 PROCEDURE — 73562 X-RAY EXAM OF KNEE 3: CPT | Mod: LT

## 2025-09-02 ENCOUNTER — APPOINTMENT (OUTPATIENT)
Dept: RADIATION ONCOLOGY | Facility: CLINIC | Age: 70
End: 2025-09-02
Payer: MEDICARE

## 2025-09-02 VITALS
WEIGHT: 232 LBS | HEART RATE: 68 BPM | DIASTOLIC BLOOD PRESSURE: 88 MMHG | RESPIRATION RATE: 18 BRPM | BODY MASS INDEX: 38.02 KG/M2 | OXYGEN SATURATION: 100 % | SYSTOLIC BLOOD PRESSURE: 130 MMHG

## 2025-09-02 PROCEDURE — 99205 OFFICE O/P NEW HI 60 MIN: CPT

## 2025-09-10 ENCOUNTER — APPOINTMENT (OUTPATIENT)
Dept: HEMATOLOGY ONCOLOGY | Facility: CLINIC | Age: 70
End: 2025-09-10

## 2025-09-10 VITALS
SYSTOLIC BLOOD PRESSURE: 149 MMHG | BODY MASS INDEX: 35.52 KG/M2 | TEMPERATURE: 97 F | OXYGEN SATURATION: 97 % | HEIGHT: 67.6 IN | HEART RATE: 62 BPM | RESPIRATION RATE: 16 BRPM | WEIGHT: 231.68 LBS | DIASTOLIC BLOOD PRESSURE: 78 MMHG

## 2025-09-10 DIAGNOSIS — D05.10 INTRADUCTAL CARCINOMA IN SITU OF UNSPECIFIED BREAST: ICD-10-CM

## 2025-09-10 PROCEDURE — 99205 OFFICE O/P NEW HI 60 MIN: CPT

## 2025-09-16 ENCOUNTER — NON-APPOINTMENT (OUTPATIENT)
Age: 70
End: 2025-09-16

## 2025-09-19 ENCOUNTER — NON-APPOINTMENT (OUTPATIENT)
Age: 70
End: 2025-09-19

## (undated) DEVICE — Device

## (undated) DEVICE — DRSG AQUACEL 3.5 X 10"

## (undated) DEVICE — DRSG STERISTRIPS 0.5 X 4"

## (undated) DEVICE — DRSG MAMMARY SUPPORT XL SIZE 5

## (undated) DEVICE — SUT QUILL MONODERM 3-0 30CM 19MM

## (undated) DEVICE — POSITIONER PATIENT SAFETY STRAP 3X60"

## (undated) DEVICE — POSITIONER STRAP ARMBOARD VELCRO TS-30

## (undated) DEVICE — PREP CHLORAPREP HI-LITE ORANGE 26ML

## (undated) DEVICE — NDL HYPO SAFE 22G X 1.5" (BLACK)

## (undated) DEVICE — DRSG DERMABOND 0.7ML

## (undated) DEVICE — SAW BLADE STRYKER SAGITTAL DUAL CUT 64X35X.89MM

## (undated) DEVICE — SOL IRR BAG NS 0.9% 3000ML

## (undated) DEVICE — SUT MONOCRYL 4-0 27" PS-2 UNDYED

## (undated) DEVICE — ELCTR ROCKER SWITCH PENCIL BLUE 10FT

## (undated) DEVICE — SUT VICRYL 2-0 27" PS-2 UNDYED

## (undated) DEVICE — MAKO BLADE NARROW

## (undated) DEVICE — DRAPE 3/4 SHEET 52X76"

## (undated) DEVICE — DRAPE EXTREMITY 87" X 106" X 128"

## (undated) DEVICE — TOURNIQUET CUFF 34" DUAL PORT W PLC

## (undated) DEVICE — DRSG COBAN 4"

## (undated) DEVICE — MAKO DRAPE KIT

## (undated) DEVICE — POSITIONER CARDIAC BUMP

## (undated) DEVICE — NDL HYPO SAFE 25G X 1" (ORANGE)

## (undated) DEVICE — WOUND IRR IRRISEPT W 0.5 CHG

## (undated) DEVICE — GOWN SMARTGOWN RAGLAN XLG

## (undated) DEVICE — SUCTION YANKAUER NO CONTROL VENT

## (undated) DEVICE — VENODYNE/SCD SLEEVE CALF MEDIUM

## (undated) DEVICE — DRAPE IOBAN 23" X 23"

## (undated) DEVICE — POSITIONER FOAM EGG CRATE ULNAR 2PCS (PINK)

## (undated) DEVICE — SOL IRR POUR H2O 500ML

## (undated) DEVICE — SUT QUILL PDO 0 45CM 36MM

## (undated) DEVICE — DRSG MAMMARY SUPPORT MED SIZE 2

## (undated) DEVICE — DRAPE TOWEL BLUE 17" X 24"

## (undated) DEVICE — ELCTR GROUNDING PAD ADULT COVIDIEN

## (undated) DEVICE — GLV 8 PROTEXIS (CREAM) MICRO

## (undated) DEVICE — SOL IRR POUR NS 0.9% 1500ML

## (undated) DEVICE — DRAPE LARGE SHEET 72X85"

## (undated) DEVICE — LAP PAD W RING 18 X 18"

## (undated) DEVICE — DRSG MAMMARY SUPPORT LG SIZE 4

## (undated) DEVICE — SUT SILK 2-0 30" SH

## (undated) DEVICE — WARMING BLANKET LOWER ADULT

## (undated) DEVICE — GOWN LG

## (undated) DEVICE — DRSG MAMMARY SUPPORT MED SIZE 3

## (undated) DEVICE — LABELS BLANK W PEN

## (undated) DEVICE — MAKO VIZADISC KNEE TRACKING KIT

## (undated) DEVICE — SOL IRR POUR NS 0.9% 500ML

## (undated) DEVICE — DRSG STOCKINETTE IMPERVIOUS XL 12 X 48"

## (undated) DEVICE — TAPE SILK 3"

## (undated) DEVICE — HOOD T5 PEELAWAY

## (undated) DEVICE — ELCTR PLASMA BLADE X 3.0S WIDE TIP

## (undated) DEVICE — PACK MINOR NO DRAPE

## (undated) DEVICE — SOL IRR POUR H2O 1500ML

## (undated) DEVICE — DRSG COBAN 6"

## (undated) DEVICE — PACK LIJ BASIC ORTHO

## (undated) DEVICE — GOWN XXL

## (undated) DEVICE — ELCTR BOVIE TIP BLADE INSULATED 4" EDGE

## (undated) DEVICE — SUT VICRYL 1 36" CTX UNDYED

## (undated) DEVICE — SAW BLADE STRYKER SAGITTAL 3 HOLE OSCILLATING

## (undated) DEVICE — TUBING SUCTION NONCONDUCTIVE 6MM X 12FT

## (undated) DEVICE — DRAPE INSTRUMENT POUCH 6.75" X 11"

## (undated) DEVICE — DRSG KLING 4"

## (undated) DEVICE — SYR LUER LOK 20CC

## (undated) DEVICE — DRAPE LAPAROTOMY TRANSVERSE

## (undated) DEVICE — SUT PDO 2 1/2 CIRCLE 40MM NDL 45CM

## (undated) DEVICE — DRSG TEGADERM 4 X 4.75"

## (undated) DEVICE — DRSG MAMMARY SUPPORT SM SIZE 1

## (undated) DEVICE — WARMING BLANKET FULL ADULT

## (undated) DEVICE — DRSG TELFA 3 X 8

## (undated) DEVICE — RADIOGRAPHY DVC SPEC TRANSPEC

## (undated) DEVICE — DRAPE IOBAN 33" X 23"